# Patient Record
Sex: MALE | Race: WHITE | NOT HISPANIC OR LATINO | Employment: FULL TIME | URBAN - NONMETROPOLITAN AREA
[De-identification: names, ages, dates, MRNs, and addresses within clinical notes are randomized per-mention and may not be internally consistent; named-entity substitution may affect disease eponyms.]

---

## 2017-03-01 ENCOUNTER — OFFICE VISIT (OUTPATIENT)
Dept: FAMILY MEDICINE CLINIC | Facility: CLINIC | Age: 67
End: 2017-03-01

## 2017-03-01 VITALS
DIASTOLIC BLOOD PRESSURE: 74 MMHG | OXYGEN SATURATION: 99 % | SYSTOLIC BLOOD PRESSURE: 128 MMHG | HEART RATE: 58 BPM | WEIGHT: 182 LBS | BODY MASS INDEX: 26.88 KG/M2

## 2017-03-01 DIAGNOSIS — E11.9 DIABETES MELLITUS WITHOUT COMPLICATION (HCC): ICD-10-CM

## 2017-03-01 DIAGNOSIS — I25.10 ATHEROSCLEROSIS OF NATIVE CORONARY ARTERY OF NATIVE HEART WITHOUT ANGINA PECTORIS: Primary | ICD-10-CM

## 2017-03-01 DIAGNOSIS — I10 ESSENTIAL HYPERTENSION: ICD-10-CM

## 2017-03-01 PROCEDURE — 99214 OFFICE O/P EST MOD 30 MIN: CPT | Performed by: FAMILY MEDICINE

## 2017-03-01 NOTE — PROGRESS NOTES
Subjective   Kiran Moss is a 66 y.o. male.     Diabetes   He presents for his follow-up diabetic visit. He has type 2 diabetes mellitus. His disease course has been stable. Pertinent negatives for diabetes include no chest pain, no foot paresthesias, no foot ulcerations, no polydipsia and no polyuria. He monitors urine at home 1-2 x per day. His breakfast blood glucose is taken between 7-8 am. His breakfast blood glucose range is generally  mg/dl. His dinner blood glucose is taken after 8 pm. His dinner blood glucose range is generally 110-130 mg/dl. His highest blood glucose is 110-130 mg/dl. An ACE inhibitor/angiotensin II receptor blocker is being taken. Eye exam is current.   Hypertension   This is a chronic problem. The problem is unchanged. The problem is controlled (bp 130-132/70-75). Pertinent negatives include no chest pain, orthopnea, palpitations, peripheral edema, PND or shortness of breath.   Coronary Artery Disease   Presents for follow-up visit. Pertinent negatives include no chest pain, chest pressure, chest tightness, palpitations or shortness of breath.   Arthritis   Presents for follow-up visit. He complains of stiffness. He reports no pain, joint swelling or joint warmth. The symptoms have been worsening. Affected locations include the left wrist, right knee and left knee. His pain is at a severity of 7/10.        The following portions of the patient's history were reviewed and updated as appropriate: allergies, current medications, past family history, past medical history, past social history, past surgical history and problem list.    Review of Systems   Respiratory: Negative for chest tightness and shortness of breath.    Cardiovascular: Negative for chest pain, palpitations, orthopnea and PND.   Endocrine: Negative for polydipsia and polyuria.   Musculoskeletal: Positive for arthritis and stiffness. Negative for joint swelling.       Objective   Physical Exam   Constitutional:  He is oriented to person, place, and time. He appears well-developed and well-nourished. No distress.   HENT:   Head: Normocephalic and atraumatic.   Cardiovascular: Normal rate, regular rhythm and intact distal pulses.  Exam reveals no gallop and no friction rub.    Murmur heard.   Systolic murmur is present with a grade of 2/6   Pulmonary/Chest: Effort normal and breath sounds normal. No respiratory distress. He has no wheezes. He has no rales. He exhibits no tenderness.   Musculoskeletal: He exhibits no edema.        Right knee: He exhibits normal range of motion. Tenderness found.        Left knee: He exhibits normal range of motion. Tenderness found.        Left hand: He exhibits tenderness. He exhibits normal range of motion.    Kiran had a diabetic foot exam performed (calus noted) today.   During the foot exam he had a monofilament test performed (normal).    Vascular Status -  His exam exhibits right foot vasculature normal. His exam exhibits no right foot edema. His exam exhibits left foot vasculature normal. His exam exhibits no left foot edema.  Neurological: He is alert and oriented to person, place, and time.   Skin: Skin is warm and dry. He is not diaphoretic.   Psychiatric: He has a normal mood and affect. His behavior is normal. Judgment and thought content normal.   Nursing note and vitals reviewed.      Assessment/Plan   Problems Addressed this Visit        Cardiovascular and Mediastinum    Essential hypertension    Coronary atherosclerosis of native coronary artery - Primary       Endocrine    Diabetes mellitus without complication

## 2017-05-08 ENCOUNTER — OFFICE VISIT (OUTPATIENT)
Dept: FAMILY MEDICINE CLINIC | Facility: CLINIC | Age: 67
End: 2017-05-08

## 2017-05-08 ENCOUNTER — APPOINTMENT (OUTPATIENT)
Dept: LAB | Facility: HOSPITAL | Age: 67
End: 2017-05-08

## 2017-05-08 VITALS
BODY MASS INDEX: 28.19 KG/M2 | HEART RATE: 77 BPM | HEIGHT: 69 IN | WEIGHT: 190.31 LBS | SYSTOLIC BLOOD PRESSURE: 138 MMHG | DIASTOLIC BLOOD PRESSURE: 76 MMHG | OXYGEN SATURATION: 98 %

## 2017-05-08 DIAGNOSIS — I25.10 ATHEROSCLEROSIS OF NATIVE CORONARY ARTERY OF NATIVE HEART WITHOUT ANGINA PECTORIS: ICD-10-CM

## 2017-05-08 DIAGNOSIS — E78.00 PURE HYPERCHOLESTEROLEMIA: ICD-10-CM

## 2017-05-08 DIAGNOSIS — I35.0 NONRHEUMATIC AORTIC VALVE STENOSIS: ICD-10-CM

## 2017-05-08 DIAGNOSIS — Q23.1 BICUSPID AORTIC VALVE: ICD-10-CM

## 2017-05-08 DIAGNOSIS — Z12.11 SCREEN FOR COLON CANCER: ICD-10-CM

## 2017-05-08 DIAGNOSIS — I10 ESSENTIAL HYPERTENSION: ICD-10-CM

## 2017-05-08 DIAGNOSIS — I48.0 PAROXYSMAL ATRIAL FIBRILLATION (HCC): ICD-10-CM

## 2017-05-08 DIAGNOSIS — E11.29 TYPE 2 DIABETES MELLITUS WITH MICROALBUMINURIA, WITHOUT LONG-TERM CURRENT USE OF INSULIN (HCC): ICD-10-CM

## 2017-05-08 DIAGNOSIS — I25.10 CORONARY ARTERIOSCLEROSIS: Primary | ICD-10-CM

## 2017-05-08 DIAGNOSIS — R80.9 TYPE 2 DIABETES MELLITUS WITH MICROALBUMINURIA, WITHOUT LONG-TERM CURRENT USE OF INSULIN (HCC): ICD-10-CM

## 2017-05-08 LAB
ALBUMIN SERPL-MCNC: 4.4 G/DL (ref 3.4–4.8)
ALBUMIN UR-MCNC: 31.2 MG/L
ALBUMIN/GLOB SERPL: 1.2 G/DL (ref 1.1–1.8)
ALP SERPL-CCNC: 73 U/L (ref 38–126)
ALT SERPL W P-5'-P-CCNC: 23 U/L (ref 21–72)
ANION GAP SERPL CALCULATED.3IONS-SCNC: 15 MMOL/L (ref 5–15)
ARTICHOKE IGE QN: 122 MG/DL (ref 1–129)
AST SERPL-CCNC: 41 U/L (ref 17–59)
BILIRUB SERPL-MCNC: 0.6 MG/DL (ref 0.2–1.3)
BUN BLD-MCNC: 21 MG/DL (ref 7–21)
BUN/CREAT SERPL: 21.4 (ref 7–25)
CALCIUM SPEC-SCNC: 10 MG/DL (ref 8.4–10.2)
CHLORIDE SERPL-SCNC: 100 MMOL/L (ref 95–110)
CHOLEST SERPL-MCNC: 211 MG/DL (ref 0–199)
CO2 SERPL-SCNC: 27 MMOL/L (ref 22–31)
CREAT BLD-MCNC: 0.98 MG/DL (ref 0.7–1.3)
CREAT UR-MCNC: 164.4 MG/DL
GFR SERPL CREATININE-BSD FRML MDRD: 77 ML/MIN/1.73 (ref 49–113)
GLOBULIN UR ELPH-MCNC: 3.7 GM/DL (ref 2.3–3.5)
GLUCOSE BLD-MCNC: 160 MG/DL (ref 60–100)
HBA1C MFR BLD: 7.12 % (ref 4–5.6)
HDLC SERPL-MCNC: 29 MG/DL (ref 60–200)
LDLC/HDLC SERPL: 4.33 {RATIO} (ref 0–3.55)
MICROALBUMIN/CREAT UR: 189.8 MG/G (ref 0–30)
POTASSIUM BLD-SCNC: 5.1 MMOL/L (ref 3.5–5.1)
PROT SERPL-MCNC: 8.1 G/DL (ref 6.3–8.6)
SODIUM BLD-SCNC: 142 MMOL/L (ref 137–145)
TRIGL SERPL-MCNC: 282 MG/DL (ref 20–199)

## 2017-05-08 PROCEDURE — 82043 UR ALBUMIN QUANTITATIVE: CPT | Performed by: FAMILY MEDICINE

## 2017-05-08 PROCEDURE — 83036 HEMOGLOBIN GLYCOSYLATED A1C: CPT | Performed by: FAMILY MEDICINE

## 2017-05-08 PROCEDURE — 80053 COMPREHEN METABOLIC PANEL: CPT | Performed by: FAMILY MEDICINE

## 2017-05-08 PROCEDURE — 82570 ASSAY OF URINE CREATININE: CPT | Performed by: FAMILY MEDICINE

## 2017-05-08 PROCEDURE — 36415 COLL VENOUS BLD VENIPUNCTURE: CPT | Performed by: FAMILY MEDICINE

## 2017-05-08 PROCEDURE — 80061 LIPID PANEL: CPT | Performed by: FAMILY MEDICINE

## 2017-05-08 PROCEDURE — 99214 OFFICE O/P EST MOD 30 MIN: CPT | Performed by: FAMILY MEDICINE

## 2017-05-11 ENCOUNTER — TELEPHONE (OUTPATIENT)
Dept: FAMILY MEDICINE CLINIC | Facility: CLINIC | Age: 67
End: 2017-05-11

## 2017-05-11 RX ORDER — INSULIN GLARGINE 100 [IU]/ML
14 INJECTION, SOLUTION SUBCUTANEOUS DAILY
Qty: 10 ML | Refills: 5 | Status: SHIPPED | OUTPATIENT
Start: 2017-05-11 | End: 2018-05-10 | Stop reason: SDUPTHER

## 2017-05-11 RX ORDER — ATORVASTATIN CALCIUM 40 MG/1
40 TABLET, FILM COATED ORAL DAILY
Qty: 30 TABLET | Refills: 5 | Status: SHIPPED | OUTPATIENT
Start: 2017-05-11 | End: 2017-06-02 | Stop reason: ALTCHOICE

## 2017-05-15 RX ORDER — LOSARTAN POTASSIUM 50 MG/1
TABLET ORAL
Qty: 90 TABLET | Refills: 3 | Status: SHIPPED | OUTPATIENT
Start: 2017-05-15 | End: 2017-11-08 | Stop reason: SINTOL

## 2017-06-02 ENCOUNTER — OFFICE VISIT (OUTPATIENT)
Dept: CARDIOLOGY | Facility: CLINIC | Age: 67
End: 2017-06-02

## 2017-06-02 VITALS
WEIGHT: 191 LBS | DIASTOLIC BLOOD PRESSURE: 60 MMHG | HEART RATE: 56 BPM | SYSTOLIC BLOOD PRESSURE: 150 MMHG | BODY MASS INDEX: 28.29 KG/M2 | HEIGHT: 69 IN | OXYGEN SATURATION: 98 %

## 2017-06-02 DIAGNOSIS — E78.5 HYPERLIPIDEMIA, UNSPECIFIED HYPERLIPIDEMIA TYPE: Primary | ICD-10-CM

## 2017-06-02 DIAGNOSIS — I25.10 ATHEROSCLEROSIS OF NATIVE CORONARY ARTERY OF NATIVE HEART WITHOUT ANGINA PECTORIS: Chronic | ICD-10-CM

## 2017-06-02 DIAGNOSIS — I10 ESSENTIAL HYPERTENSION: Chronic | ICD-10-CM

## 2017-06-02 DIAGNOSIS — I35.0 NONRHEUMATIC AORTIC VALVE STENOSIS: Chronic | ICD-10-CM

## 2017-06-02 PROCEDURE — 99214 OFFICE O/P EST MOD 30 MIN: CPT | Performed by: INTERNAL MEDICINE

## 2017-06-02 RX ORDER — ROSUVASTATIN CALCIUM 10 MG/1
10 TABLET, COATED ORAL DAILY
Qty: 30 TABLET | Refills: 6 | Status: SHIPPED | OUTPATIENT
Start: 2017-06-02 | End: 2017-09-29 | Stop reason: SINTOL

## 2017-06-02 NOTE — PROGRESS NOTES
Chief complaint : Aortic valve disorder    History of Present Illness.  Pleasant 66-year-old gentleman who comes today for follow-up visit.  Patient has no chest pain or chest discomfort.  He has no shortness of breath orthopnea or PND.         Review of Systems   Constitution: Negative. Negative for decreased appetite, diaphoresis, weakness, night sweats, weight gain and weight loss.   HENT: Negative for headaches, hearing loss, nosebleeds and sore throat.    Eyes: Negative.  Negative for blurred vision and photophobia.   Cardiovascular: Negative for chest pain, claudication, dyspnea on exertion, irregular heartbeat, leg swelling, palpitations, paroxysmal nocturnal dyspnea and syncope.   Respiratory: Negative for cough, hemoptysis, shortness of breath and wheezing.    Endocrine: Negative for cold intolerance, heat intolerance, polydipsia, polyphagia and polyuria.   Hematologic/Lymphatic: Negative.    Skin: Negative for color change, dry skin, flushing, itching and rash.   Musculoskeletal: Negative.  Negative for muscle cramps, muscle weakness and myalgias.   Gastrointestinal: Negative for abdominal pain, change in bowel habit, diarrhea, hematemesis, melena, nausea and vomiting.   Genitourinary: Negative for dysuria, frequency and hematuria.   Neurological: Negative for dizziness, focal weakness, light-headedness, loss of balance, numbness and seizures.   Psychiatric/Behavioral: Negative.  Negative for substance abuse, suicidal ideas and thoughts of violence.   Allergic/Immunologic: Negative.        Past Medical History:   Diagnosis Date   • Actinic keratosis    • Acute bronchitis    • Acute upper respiratory infection     Acute upper respiratory infection, unspecified   • Aortic valve stenosis     Aortic valve stenosis - post AVR   • Atrial fibrillation     Atrial fibrillation - RSR post MAZE   • Bicuspid aortic valve     Bicuspid aortic valve - AVR replacement 12/16/14   • Coronary arteriosclerosis    • Coronary  atherosclerosis of native coronary artery     Coronary atherosclerosis of native coronary artery - S/P 3.0 x 23mm Xience V stent to mid RCA 12/11 (NSTEMI   • Cortical senile cataract    • Crushing injury     Crushing injury - left 4th finger   • Diabetes mellitus     Diabetes mellitus - no retinopathy on today's exam   • 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   • History of echocardiogram 12/14/2011    Echocardiogram W/O color flow 66592 (1) - Normal LV systolic function with diastolic dysfunction. Biscupid aortic valve w/mild to moderate aortic stenosis and mild aortic insufficiency. Mild left atrial enlargement.   • Hyperlipidemia     Hyperlipidemia - cannot tolerate higher dose than 20 mg lipitor   • Hypermetropia    • Inguinal hernia, recurrent     Inguinal hernia - recurrent   • Microalbuminuria    • Need for immunization against influenza     Needs influenza immunization   • Postoperative pain     Postoperative pain - 12/16/14 AVR Medtronic concave Aortic 25 mm Mosaic [id number] model [id number] serial [id number] MAZE Resolved   • Postoperative visit     Postoperative visit - 12/16/14 AVR Medtronic concave Aortic 25 mm Mosaic [id number] model [id number] serial [id number] MAZE   • Scalding pain on urination    • Type 2 diabetes mellitus        Family History   Problem Relation Age of Onset   • Diabetes Other    • Nephrolithiasis Other    • Coronary artery disease Neg Hx        Lisinopril     reports that he has quit smoking. He has never used smokeless tobacco. He reports that he drinks  alcohol. He reports that he does not use illicit drugs.    Objective     Vital Signs  Heart Rate:  [56] 56  BP: (150)/(60) 150/60    Physical Exam   Constitutional: He is oriented to person, place, and time. He appears well-developed and well-nourished.   HENT:   Head: Normocephalic and atraumatic.   Eyes: Conjunctivae and EOM are normal. Pupils are equal, round, and reactive to light.   Neck: Neck supple. No JVD present. Carotid bruit is not present. No tracheal deviation and no edema present.   Cardiovascular: Normal rate, regular rhythm, S1 normal, S2 normal, normal heart sounds and intact distal pulses.  Exam reveals no gallop, no S3, no S4 and no friction rub.    No murmur heard.  Pulmonary/Chest: Effort normal and breath sounds normal. He has no wheezes. He has no rales. He exhibits no tenderness.   Abdominal: Bowel sounds are normal. He exhibits no abdominal bruit and no pulsatile midline mass. There is no rebound and no guarding.   Musculoskeletal: Normal range of motion. He exhibits no edema.   Neurological: He is alert and oriented to person, place, and time.   Skin: Skin is warm and dry.   Psychiatric: He has a normal mood and affect.       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 Hyperlipidemia, unspecified hyperlipidemia  type  We will continue with current medications.  - Lipid Panel; Future  - Comprehensive Metabolic Panel; Future    2. Essential hypertension  Blood pressure is fairly controlled we will continue with current medications and risk factor modification.    3. Atherosclerosis of native coronary artery of native heart without angina pectoris  December 2011: 3.0 x 23 mm Xience V stent to the RCA  December 2014: Cardiac catheterization revealed patent stent in the RCA with minimal nonobstructive coronary disease noted in the LAD of approximately 30%   Plan:  · Continue with guideline direct medical therapy.  · Continue with risk factor modification.    4. Nonrheumatic aortic valve stenosis  December 16, 2014 : Aortic valve replacement (Medtronic mosaic porcine heart valve  model #305, size 25 mm, serial #[id number] along with extensive maze procedure and cryoablation.  Left atrial appendage excision.  Last echocardiogram was March 11, 2015 which revealed well seated bioprosthetic aortic valve the aortic position with normal function.  Mean gradient across the valve is 21 mmHg.  Plan:  · We will continue to monitor patient's aortic valve 22.  · 2-D echocardiogram will be performed with any clinical change or by 2020.  · Continue with aspirin    I discussed the patients findings and my recommendations with patient.          This document has been electronically signed by Dana Squires MD on June 25, 2017 10:05 AM     Daan Squires MD  06/02/17  10:49 AM

## 2017-06-08 ENCOUNTER — TELEPHONE (OUTPATIENT)
Dept: CARDIOLOGY | Facility: CLINIC | Age: 67
End: 2017-06-08

## 2017-06-08 NOTE — TELEPHONE ENCOUNTER
Mr. Moss called concerning his Low BP and heart rate. Upon questioning, he is nonsymptomatic at this time. He took his metoprolol this morning. Dr. Squires was notified and wants him to hold his Metoprolol for now. Mr. Moss will be seen tomorrow for evaluation.

## 2017-06-09 ENCOUNTER — OFFICE VISIT (OUTPATIENT)
Dept: CARDIOLOGY | Facility: CLINIC | Age: 67
End: 2017-06-09

## 2017-06-09 VITALS
BODY MASS INDEX: 28.44 KG/M2 | OXYGEN SATURATION: 98 % | DIASTOLIC BLOOD PRESSURE: 60 MMHG | WEIGHT: 192 LBS | HEART RATE: 56 BPM | HEIGHT: 69 IN | SYSTOLIC BLOOD PRESSURE: 132 MMHG

## 2017-06-09 DIAGNOSIS — E78.00 PURE HYPERCHOLESTEROLEMIA: Primary | Chronic | ICD-10-CM

## 2017-06-09 DIAGNOSIS — I10 ESSENTIAL HYPERTENSION: Chronic | ICD-10-CM

## 2017-06-09 DIAGNOSIS — R00.1 BRADYCARDIA: Primary | ICD-10-CM

## 2017-06-09 DIAGNOSIS — I48.0 PAROXYSMAL ATRIAL FIBRILLATION (HCC): ICD-10-CM

## 2017-06-09 PROCEDURE — 99213 OFFICE O/P EST LOW 20 MIN: CPT | Performed by: INTERNAL MEDICINE

## 2017-06-09 RX ORDER — TAMSULOSIN HYDROCHLORIDE 0.4 MG/1
CAPSULE ORAL
Refills: 0 | COMMUNITY
Start: 2017-06-07 | End: 2017-11-08

## 2017-06-09 RX ORDER — CIPROFLOXACIN 500 MG/1
TABLET, FILM COATED ORAL
Refills: 0 | COMMUNITY
Start: 2017-06-07 | End: 2017-11-08

## 2017-06-25 NOTE — PROGRESS NOTES
Chief complaint : Hypotension    History of Present Illness patient apparently had an episode of low blood pressure and low heart rate.  This happened during his recent literature see procedure for nephrolithiasis.  His losartan and metoprolol was on hold.  Currently his symptoms have improved.  He has no chest pain or chest discomfort.         Review of Systems   Constitution: Negative. Negative for decreased appetite, diaphoresis, weakness, night sweats, weight gain and weight loss.   HENT: Negative for headaches, hearing loss, nosebleeds and sore throat.    Eyes: Negative.  Negative for blurred vision and photophobia.   Cardiovascular: Negative for chest pain, claudication, dyspnea on exertion, irregular heartbeat, leg swelling, palpitations, paroxysmal nocturnal dyspnea and syncope.   Respiratory: Negative for cough, hemoptysis, shortness of breath and wheezing.    Endocrine: Negative for cold intolerance, heat intolerance, polydipsia, polyphagia and polyuria.   Hematologic/Lymphatic: Negative.    Skin: Negative for color change, dry skin, flushing, itching and rash.   Musculoskeletal: Negative.  Negative for muscle cramps, muscle weakness and myalgias.   Gastrointestinal: Negative for abdominal pain, change in bowel habit, diarrhea, hematemesis, melena, nausea and vomiting.   Genitourinary: Negative for dysuria, frequency and hematuria.   Neurological: Negative for dizziness, focal weakness, light-headedness, loss of balance, numbness and seizures.   Psychiatric/Behavioral: Negative.  Negative for substance abuse, suicidal ideas and thoughts of violence.   Allergic/Immunologic: Negative.        Past Medical History:   Diagnosis Date   • Actinic keratosis    • Acute bronchitis    • Acute upper respiratory infection     Acute upper respiratory infection, unspecified   • Aortic valve stenosis     Aortic valve stenosis - post AVR   • Atrial fibrillation     Atrial fibrillation - RSR post MAZE   • Bicuspid aortic  valve     Bicuspid aortic valve - AVR replacement 12/16/14   • Coronary arteriosclerosis    • Coronary atherosclerosis of native coronary artery     Coronary atherosclerosis of native coronary artery - S/P 3.0 x 23mm Xience V stent to mid RCA 12/11 (NSTEMI   • Cortical senile cataract    • Crushing injury     Crushing injury - left 4th finger   • Diabetes mellitus     Diabetes mellitus - no retinopathy on today's exam   • 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   • History of echocardiogram 12/14/2011    Echocardiogram W/O color flow 84642 (1) - Normal LV systolic function with diastolic dysfunction. Biscupid aortic valve w/mild to moderate aortic stenosis and mild aortic insufficiency. Mild left atrial enlargement.   • Hyperlipidemia     Hyperlipidemia - cannot tolerate higher dose than 20 mg lipitor   • Hypermetropia    • Inguinal hernia, recurrent     Inguinal hernia - recurrent   • Microalbuminuria    • Need for immunization against influenza     Needs influenza immunization   • Postoperative pain     Postoperative pain - 12/16/14 AVR Medtronic concave Aortic 25 mm Mosaic [id number] model [id number] serial [id number] MAZE Resolved   • Postoperative visit     Postoperative visit - 12/16/14 AVR Medtronic concave Aortic 25 mm Mosaic [id number] model [id number] serial [id number] MAZE   • Scalding pain on urination    • Type 2 diabetes mellitus        Family History   Problem Relation Age of Onset   • Diabetes Other    • Nephrolithiasis Other    • Coronary artery disease Neg Hx   "      Lisinopril     reports that he has quit smoking. He has never used smokeless tobacco. He reports that he drinks alcohol. He reports that he does not use illicit drugs.    Objective     Vital Signs:     6/9/17 6/2/17 5/8/17    /60 150/60 138/76   Heart Rate 56 56 77   SpO2 98 % 98 % 98 %   Weight 192 lb (87.1 kg) 191 lb (86.6 kg) 190 lb 5 oz (86.3 kg)   Height 69\" (175.3 cm) 69\" (175.3 cm) 69\" (175.3 cm)   BMI (Calculated) 28.3 28.2 28.1   BSA (Calculated - sq m) 2.03 sq meters 2.03 sq meters 2.02 sq meters       Physical Exam   Constitutional: He is oriented to person, place, and time. He appears well-developed and well-nourished.   HENT:   Head: Normocephalic and atraumatic.   Eyes: Conjunctivae and EOM are normal. Pupils are equal, round, and reactive to light.   Neck: Neck supple. No JVD present. Carotid bruit is not present. No tracheal deviation and no edema present.   Cardiovascular: Normal rate, regular rhythm, S1 normal, S2 normal, normal heart sounds and intact distal pulses.  Exam reveals no gallop, no S3, no S4 and no friction rub.    No murmur heard.  Pulmonary/Chest: Effort normal and breath sounds normal. He has no wheezes. He has no rales. He exhibits no tenderness.   Abdominal: Bowel sounds are normal. He exhibits no abdominal bruit and no pulsatile midline mass. There is no rebound and no guarding.   Musculoskeletal: Normal range of motion. He exhibits no edema.   Neurological: He is alert and oriented to person, place, and time.   Skin: Skin is warm and dry.   Psychiatric: He has a normal mood and affect.         ECG 12 Lead  Date/Time: 6/26/2017 11:29 AM  Performed by: SIDRA RANKIN  Authorized by: SIDRA RANKIN   Previous ECG: no previous ECG available  Rhythm: sinus bradycardia  Rate: bradycardic  BPM: 54  Conduction: right bundle branch block  ST Segments: ST segments normal  T Waves: T waves normal  QRS axis: normal  Other: no other findings  Clinical impression: abnormal " ECG            Assessment/Plan     Patient Active Problem List   Diagnosis   • Hyperlipidemia   • Essential hypertension   • Benign non-nodular prostatic hyperplasia with lower urinary tract symptoms   • Type 2 diabetes mellitus with microalbuminuria   • Diabetic retinopathy   • Coronary atherosclerosis of native coronary artery   • Bicuspid aortic valve   • Aortic valve stenosis   • Cortical age-related cataract   • Nuclear cataract   • Microalbuminuria   • Inguinal hernia, recurrent   • 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 Pure hypercholesterolemia  We will continue with current medications    2. Essential hypertension  Since his hypotension and bradycardia has resolved I have recommended that we restart his medication.    I discussed the patients findings and my recommendations with patient.        This document has been electronically signed by Dana Squires MD on June 26, 2017 11:27 AM     Dana Squires MD  06/26/17  11:26 AM

## 2017-06-26 PROCEDURE — 93000 ELECTROCARDIOGRAM COMPLETE: CPT | Performed by: INTERNAL MEDICINE

## 2017-09-29 ENCOUNTER — OFFICE VISIT (OUTPATIENT)
Dept: CARDIOLOGY | Facility: CLINIC | Age: 67
End: 2017-09-29

## 2017-09-29 VITALS
WEIGHT: 189 LBS | BODY MASS INDEX: 27.99 KG/M2 | OXYGEN SATURATION: 97 % | SYSTOLIC BLOOD PRESSURE: 170 MMHG | DIASTOLIC BLOOD PRESSURE: 80 MMHG | HEIGHT: 69 IN | HEART RATE: 70 BPM

## 2017-09-29 DIAGNOSIS — I35.0 NONRHEUMATIC AORTIC VALVE STENOSIS: Chronic | ICD-10-CM

## 2017-09-29 DIAGNOSIS — I10 ESSENTIAL HYPERTENSION: Chronic | ICD-10-CM

## 2017-09-29 DIAGNOSIS — I25.10 ATHEROSCLEROSIS OF NATIVE CORONARY ARTERY OF NATIVE HEART WITHOUT ANGINA PECTORIS: Chronic | ICD-10-CM

## 2017-09-29 DIAGNOSIS — E78.00 PURE HYPERCHOLESTEROLEMIA: Primary | Chronic | ICD-10-CM

## 2017-09-29 PROCEDURE — 99213 OFFICE O/P EST LOW 20 MIN: CPT | Performed by: INTERNAL MEDICINE

## 2017-09-29 RX ORDER — GLIPIZIDE 10 MG/1
TABLET ORAL
Qty: 180 TABLET | Refills: 3 | Status: SHIPPED | OUTPATIENT
Start: 2017-09-29 | End: 2018-05-10 | Stop reason: SDUPTHER

## 2017-09-29 RX ORDER — CELECOXIB 50 MG/1
50 CAPSULE ORAL 2 TIMES DAILY
Qty: 60 CAPSULE | Refills: 6 | Status: SHIPPED | OUTPATIENT
Start: 2017-09-29 | End: 2018-05-10 | Stop reason: SDUPTHER

## 2017-09-29 RX ORDER — HYDROCHLOROTHIAZIDE 25 MG/1
25 TABLET ORAL DAILY
Qty: 30 TABLET | Refills: 11 | Status: SHIPPED | OUTPATIENT
Start: 2017-09-29 | End: 2018-05-16 | Stop reason: SINTOL

## 2017-11-08 ENCOUNTER — OFFICE VISIT (OUTPATIENT)
Dept: FAMILY MEDICINE CLINIC | Facility: CLINIC | Age: 67
End: 2017-11-08

## 2017-11-08 VITALS
HEIGHT: 69 IN | BODY MASS INDEX: 26.66 KG/M2 | HEART RATE: 74 BPM | DIASTOLIC BLOOD PRESSURE: 70 MMHG | SYSTOLIC BLOOD PRESSURE: 122 MMHG | OXYGEN SATURATION: 99 % | WEIGHT: 180 LBS

## 2017-11-08 DIAGNOSIS — I10 ESSENTIAL HYPERTENSION: ICD-10-CM

## 2017-11-08 DIAGNOSIS — Z23 FLU VACCINE NEED: ICD-10-CM

## 2017-11-08 DIAGNOSIS — E11.29 TYPE 2 DIABETES MELLITUS WITH MICROALBUMINURIA, WITH LONG-TERM CURRENT USE OF INSULIN (HCC): ICD-10-CM

## 2017-11-08 DIAGNOSIS — E78.00 PURE HYPERCHOLESTEROLEMIA: ICD-10-CM

## 2017-11-08 DIAGNOSIS — Z79.4 TYPE 2 DIABETES MELLITUS WITH MICROALBUMINURIA, WITH LONG-TERM CURRENT USE OF INSULIN (HCC): ICD-10-CM

## 2017-11-08 DIAGNOSIS — R80.9 MICROALBUMINURIA: ICD-10-CM

## 2017-11-08 DIAGNOSIS — R80.9 TYPE 2 DIABETES MELLITUS WITH MICROALBUMINURIA, WITH LONG-TERM CURRENT USE OF INSULIN (HCC): ICD-10-CM

## 2017-11-08 DIAGNOSIS — I25.10 ATHEROSCLEROSIS OF NATIVE CORONARY ARTERY OF NATIVE HEART WITHOUT ANGINA PECTORIS: ICD-10-CM

## 2017-11-08 DIAGNOSIS — Z23 PNEUMOCOCCAL VACCINATION ADMINISTERED AT CURRENT VISIT: ICD-10-CM

## 2017-11-08 DIAGNOSIS — Z12.11 SCREEN FOR COLON CANCER: Primary | ICD-10-CM

## 2017-11-08 PROCEDURE — 90471 IMMUNIZATION ADMIN: CPT | Performed by: FAMILY MEDICINE

## 2017-11-08 PROCEDURE — 90472 IMMUNIZATION ADMIN EACH ADD: CPT | Performed by: FAMILY MEDICINE

## 2017-11-08 PROCEDURE — 90732 PPSV23 VACC 2 YRS+ SUBQ/IM: CPT | Performed by: FAMILY MEDICINE

## 2017-11-08 PROCEDURE — 99214 OFFICE O/P EST MOD 30 MIN: CPT | Performed by: FAMILY MEDICINE

## 2017-11-08 PROCEDURE — 90662 IIV NO PRSV INCREASED AG IM: CPT | Performed by: FAMILY MEDICINE

## 2017-11-08 RX ORDER — FENOFIBRATE 160 MG/1
160 TABLET ORAL DAILY
Qty: 30 TABLET | Refills: 11 | Status: SHIPPED | OUTPATIENT
Start: 2017-11-08 | End: 2018-05-10 | Stop reason: SDUPTHER

## 2017-11-08 NOTE — PROGRESS NOTES
Subjective   Kiran Moss is a 67 y.o. male.     Hypertension   This is a chronic problem. The current episode started more than 1 year ago. The problem is unchanged. The problem is controlled (bp 130-132/70-75). Associated symptoms include peripheral edema. Pertinent negatives include no orthopnea or PND.   Diabetes   He presents for his follow-up diabetic visit. He has type 2 diabetes mellitus. His disease course has been stable. Associated symptoms include polyuria. Pertinent negatives for diabetes include no foot paresthesias, no foot ulcerations and no polydipsia. He monitors urine at home 1-2 x per day. His breakfast blood glucose is taken between 7-8 am. His breakfast blood glucose range is generally 110-130 mg/dl. His dinner blood glucose is taken after 8 pm. His dinner blood glucose range is generally 140-180 mg/dl. An ACE inhibitor/angiotensin II receptor blocker is being taken. Eye exam is current.   Hyperlipidemia   Associated symptoms include myalgias.   Coronary Artery Disease   Presents for follow-up visit. Pertinent negatives include no chest tightness or leg swelling. Risk factors include hyperlipidemia.        The following portions of the patient's history were reviewed and updated as appropriate: allergies, current medications, past family history, past medical history, past social history, past surgical history and problem list.    Review of Systems   Respiratory: Negative for chest tightness.    Cardiovascular: Negative for orthopnea, leg swelling and PND.   Endocrine: Positive for polyuria. Negative for polydipsia.   Musculoskeletal: Positive for myalgias.       Objective   Physical Exam   Constitutional: He is oriented to person, place, and time. He appears well-developed and well-nourished. No distress.   HENT:   Head: Normocephalic and atraumatic.   Cardiovascular: Normal rate, regular rhythm and intact distal pulses.  Exam reveals no gallop and no friction rub.    Murmur heard.    Systolic murmur is present with a grade of 2/6   Pulmonary/Chest: Effort normal and breath sounds normal. No respiratory distress. He has no wheezes. He has no rales. He exhibits no tenderness.   Musculoskeletal: He exhibits no edema.    Kiran had a diabetic foot exam performed (calus noted) today.   During the foot exam he had a monofilament test performed (normal).    Vascular Status -  His exam exhibits right foot vasculature normal. His exam exhibits no right foot edema. His exam exhibits left foot vasculature normal. His exam exhibits no left foot edema.  Neurological: He is alert and oriented to person, place, and time.   Skin: Skin is warm and dry. He is not diaphoretic.   Psychiatric: He has a normal mood and affect. His behavior is normal. Judgment and thought content normal.   Nursing note and vitals reviewed.      Assessment/Plan   Problems Addressed this Visit        Cardiovascular and Mediastinum    Essential hypertension (Chronic)    Relevant Orders    Comprehensive Metabolic Panel       Endocrine    Diabetes mellitus    Relevant Orders    Hemoglobin A1c    Microalbumin / Creatinine Urine Ratio - Urine, Clean Catch       Genitourinary    Microalbuminuria      Other Visit Diagnoses     Screen for colon cancer    -  Primary    Relevant Orders    Ambulatory Referral to General Surgery    Atherosclerosis of native coronary artery of native heart without angina pectoris        Pure hypercholesterolemia        Relevant Medications    fenofibrate 160 MG tablet    Other Relevant Orders    Lipid Panel

## 2017-11-09 ENCOUNTER — PREP FOR SURGERY (OUTPATIENT)
Dept: OTHER | Facility: HOSPITAL | Age: 67
End: 2017-11-09

## 2017-11-09 DIAGNOSIS — Z12.11 SCREEN FOR COLON CANCER: Primary | ICD-10-CM

## 2017-11-09 RX ORDER — DEXTROSE AND SODIUM CHLORIDE 5; .45 G/100ML; G/100ML
100 INJECTION, SOLUTION INTRAVENOUS CONTINUOUS
Status: CANCELLED | OUTPATIENT
Start: 2017-11-20

## 2017-11-13 PROBLEM — Z12.11 SCREEN FOR COLON CANCER: Status: ACTIVE | Noted: 2017-11-13

## 2017-11-20 ENCOUNTER — ANESTHESIA EVENT (OUTPATIENT)
Dept: GASTROENTEROLOGY | Facility: HOSPITAL | Age: 67
End: 2017-11-20

## 2017-11-20 ENCOUNTER — HOSPITAL ENCOUNTER (OUTPATIENT)
Facility: HOSPITAL | Age: 67
Setting detail: HOSPITAL OUTPATIENT SURGERY
Discharge: HOME | End: 2017-11-20
Attending: SURGERY | Admitting: SURGERY

## 2017-11-20 ENCOUNTER — ANESTHESIA (OUTPATIENT)
Dept: GASTROENTEROLOGY | Facility: HOSPITAL | Age: 67
End: 2017-11-20

## 2017-11-20 VITALS
TEMPERATURE: 97.6 F | HEART RATE: 81 BPM | RESPIRATION RATE: 16 BRPM | HEIGHT: 69 IN | OXYGEN SATURATION: 98 % | DIASTOLIC BLOOD PRESSURE: 72 MMHG | WEIGHT: 182 LBS | BODY MASS INDEX: 26.96 KG/M2 | SYSTOLIC BLOOD PRESSURE: 115 MMHG

## 2017-11-20 DIAGNOSIS — Z12.11 SCREEN FOR COLON CANCER: ICD-10-CM

## 2017-11-20 LAB — GLUCOSE BLDC GLUCOMTR-MCNC: 95 MG/DL (ref 70–130)

## 2017-11-20 PROCEDURE — 82962 GLUCOSE BLOOD TEST: CPT

## 2017-11-20 PROCEDURE — 45378 DIAGNOSTIC COLONOSCOPY: CPT | Performed by: SURGERY

## 2017-11-20 PROCEDURE — 25010000002 PROPOFOL 10 MG/ML EMULSION: Performed by: NURSE ANESTHETIST, CERTIFIED REGISTERED

## 2017-11-20 RX ORDER — ONDANSETRON 2 MG/ML
4 INJECTION INTRAMUSCULAR; INTRAVENOUS ONCE AS NEEDED
Status: DISCONTINUED | OUTPATIENT
Start: 2017-11-20 | End: 2017-11-20 | Stop reason: HOSPADM

## 2017-11-20 RX ORDER — PROMETHAZINE HYDROCHLORIDE 25 MG/ML
12.5 INJECTION, SOLUTION INTRAMUSCULAR; INTRAVENOUS ONCE AS NEEDED
Status: DISCONTINUED | OUTPATIENT
Start: 2017-11-20 | End: 2017-11-20 | Stop reason: HOSPADM

## 2017-11-20 RX ORDER — PROPOFOL 10 MG/ML
VIAL (ML) INTRAVENOUS AS NEEDED
Status: DISCONTINUED | OUTPATIENT
Start: 2017-11-20 | End: 2017-11-20 | Stop reason: SURG

## 2017-11-20 RX ORDER — DEXTROSE AND SODIUM CHLORIDE 5; .45 G/100ML; G/100ML
100 INJECTION, SOLUTION INTRAVENOUS CONTINUOUS
Status: DISCONTINUED | OUTPATIENT
Start: 2017-11-20 | End: 2017-11-20 | Stop reason: HOSPADM

## 2017-11-20 RX ORDER — PROMETHAZINE HYDROCHLORIDE 25 MG/1
25 TABLET ORAL ONCE AS NEEDED
Status: DISCONTINUED | OUTPATIENT
Start: 2017-11-20 | End: 2017-11-20 | Stop reason: HOSPADM

## 2017-11-20 RX ORDER — MEPERIDINE HYDROCHLORIDE 50 MG/ML
12.5 INJECTION INTRAMUSCULAR; INTRAVENOUS; SUBCUTANEOUS
Status: DISCONTINUED | OUTPATIENT
Start: 2017-11-20 | End: 2017-11-20 | Stop reason: HOSPADM

## 2017-11-20 RX ORDER — DEXAMETHASONE SODIUM PHOSPHATE 4 MG/ML
8 INJECTION, SOLUTION INTRA-ARTICULAR; INTRALESIONAL; INTRAMUSCULAR; INTRAVENOUS; SOFT TISSUE ONCE AS NEEDED
Status: DISCONTINUED | OUTPATIENT
Start: 2017-11-20 | End: 2017-11-20 | Stop reason: HOSPADM

## 2017-11-20 RX ORDER — PROMETHAZINE HYDROCHLORIDE 25 MG/1
25 SUPPOSITORY RECTAL ONCE AS NEEDED
Status: DISCONTINUED | OUTPATIENT
Start: 2017-11-20 | End: 2017-11-20 | Stop reason: HOSPADM

## 2017-11-20 RX ADMIN — PROPOFOL 30 MG: 10 INJECTION, EMULSION INTRAVENOUS at 10:05

## 2017-11-20 RX ADMIN — PROPOFOL 50 MG: 10 INJECTION, EMULSION INTRAVENOUS at 10:07

## 2017-11-20 RX ADMIN — DEXTROSE AND SODIUM CHLORIDE 100 ML/HR: 5; 450 INJECTION, SOLUTION INTRAVENOUS at 09:36

## 2017-11-20 RX ADMIN — PROPOFOL 50 MG: 10 INJECTION, EMULSION INTRAVENOUS at 10:14

## 2017-11-20 RX ADMIN — PROPOFOL 50 MG: 10 INJECTION, EMULSION INTRAVENOUS at 10:20

## 2017-11-20 RX ADMIN — PROPOFOL 20 MG: 10 INJECTION, EMULSION INTRAVENOUS at 10:11

## 2017-11-20 NOTE — PLAN OF CARE
Problem: Patient Care Overview (Adult)  Goal: Plan of Care Review  Outcome: Ongoing (interventions implemented as appropriate)    11/20/17 1036   Coping/Psychosocial Response Interventions   Plan Of Care Reviewed With patient   Patient Care Overview   Progress no change   Outcome Evaluation   Outcome Summary/Follow up Plan jerson well

## 2017-11-20 NOTE — PLAN OF CARE
Problem: Patient Care Overview (Adult)  Goal: Plan of Care Review    11/20/17 1030   Coping/Psychosocial Response Interventions   Plan Of Care Reviewed With patient   Patient Care Overview   Progress no change   Outcome Evaluation   Outcome Summary/Follow up Plan vss         Problem: GI Endoscopy (Adult)  Goal: Signs and Symptoms of Listed Potential Problems Will be Absent or Manageable (GI Endoscopy)    11/20/17 1030   GI Endoscopy   Problems Assessed (GI Endoscopy) all   Problems Present (GI Endoscopy) none

## 2017-11-20 NOTE — ANESTHESIA PREPROCEDURE EVALUATION
Anesthesia Evaluation     Patient summary reviewed and Nursing notes reviewed   no history of anesthetic complications:  NPO Solid Status: > 8 hours  NPO Liquid Status: > 8 hours     Airway   Mallampati: II  TM distance: >3 FB  Neck ROM: full  no difficulty expected  Dental - normal exam     Pulmonary - normal exam    breath sounds clear to auscultation  (+) asthma,   (-) shortness of breath  Cardiovascular - normal exam  Exercise tolerance: good (4-7 METS)    Rhythm: regular  Rate: normal    (+) hypertension, valvular problems/murmurs, CAD, dysrhythmias, hyperlipidemia  (-) angina, orthopnea, GOLDMAN      Neuro/Psych- negative ROS  (-) CVA  GI/Hepatic/Renal/Endo    (+)  diabetes mellitus,     Musculoskeletal (-) negative ROS    Abdominal  - normal exam   Substance History - negative use     OB/GYN negative ob/gyn ROS         Other - negative ROS                                       Anesthesia Plan    ASA 2     MAC     intravenous induction   Anesthetic plan and risks discussed with patient.

## 2017-11-20 NOTE — ANESTHESIA POSTPROCEDURE EVALUATION
Patient: Kiran Moss    Procedure Summary     Date Anesthesia Start Anesthesia Stop Room / Location    11/20/17 0959 1029 [facility] ENDOSCOPY 2 / [facility] ENDOSCOPY       Procedure Diagnosis Surgeon Provider    COLONOSCOPY (N/A ) Screen for colon cancer  (Screen for colon cancer [Z12.11]) MD Josiah Lou CRNA          Anesthesia Type: MAC  Last vitals  BP   139/83 (11/20/17 0927)   Temp   97.8 °F (36.6 °C) (11/20/17 0927)   Pulse   96 (11/20/17 0927)   Resp   18 (11/20/17 0927)     SpO2   97 % (11/20/17 0927)     Post Anesthesia Care and Evaluation    Patient location during evaluation: bedside  Patient participation: complete - patient participated  Level of consciousness: awake and alert  Pain management: adequate  Airway patency: patent  Anesthetic complications: No anesthetic complications    Cardiovascular status: acceptable  Respiratory status: acceptable  Hydration status: acceptable

## 2017-11-20 NOTE — PLAN OF CARE
Problem: GI Endoscopy (Adult)  Goal: Signs and Symptoms of Listed Potential Problems Will be Absent or Manageable (GI Endoscopy)  Outcome: Ongoing (interventions implemented as appropriate)    11/20/17 1035   GI Endoscopy   Problems Assessed (GI Endoscopy) all   Problems Present (GI Endoscopy) none

## 2017-11-20 NOTE — H&P
No chief complaint on file.  Dr Powell referred for screening.  Kiran Moss is a 67 y.o. male referred today for evaluation for colonoscopy.  He notes no change in bowel habits, no blood in the stool.   Attempted cscope 10yrs ago but poor prep.  Not good study per Dr Hernandez.   Prior Colonoscopy:no  Prior Polyps:no  Family History of Colon Cancer:no  On anticoagulation:no    Past Surgical History:   Procedure Laterality Date   • ANGIOPLASTY  07/16/2000    Angioplasty, coronary (1) - wide open patency to anterior descending artery at the inflation sites. No intimal luminal irregularities, tears, or residual stenosis found   • AORTIC VALVE REPAIR/REPLACEMENT  12/16/2014    Replacement of aortic valve (1) - 12/16/14 AVR RxAntetronic concave Aortic 25 mm Mosaic [id number] model [id number] serial [id number] MAZE   • CARDIAC CATHETERIZATION  12/10/2014    Cardiac cath 38731 (3) - Patent right coronary artery stent. MInimal nonobstructive disease in the LAD and left circumflex.   • CARDIAC SURGERY      valve   • ENDOSCOPY  10/23/2007    Colon endoscopy 89914 (1) - remarkably poor [rep potentially obscures any small polyps that might be present. Colonic mucosa appeared entirely NRL. There were no masses or polyps found   • EXCISION LESION  07/31/2015    Destruction of Premalignant Lesion (1st) 29549 (1) - CYNTHIA POWELL (FP)   • EXCISION LESION  07/31/2015    Destruction of Premalignant Lesions (2-14) 04088 (1) - CYNTHIA POWELL (FP)   • INGUINAL HERNIA REPAIR  11/05/2007    Inguinal hernia, repair (1) - Sutured right inguinal hernia repair. Direct inguinal hernia, recurring in previous mesh repair   • KNEE SURGERY  08/06/1985    Knee Surgery (1) - Debridement, arthroscopy, abrasive chondoplasty. Degenerative chondromalacia, medial femoral condyle, left knee   • VASECTOMY  03/24/1996    Vasectomy - ligate (1) - elective     Past Medical History:   Diagnosis Date   • Actinic keratosis    • Acute bronchitis    • Acute upper respiratory  infection     Acute upper respiratory infection, unspecified   • Aortic valve stenosis     Aortic valve stenosis - post AVR   • Asthma    • Atrial fibrillation     Atrial fibrillation - RSR post MAZE   • Bicuspid aortic valve     Bicuspid aortic valve - AVR replacement 12/16/14   • Coronary arteriosclerosis    • Coronary atherosclerosis of native coronary artery     Coronary atherosclerosis of native coronary artery - S/P 3.0 x 23mm Xience V stent to mid RCA 12/11 (NSTEMI   • Cortical senile cataract    • Crushing injury     Crushing injury - left 4th finger   • Diabetes mellitus     Diabetes mellitus - no retinopathy on today's exam   • 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   • History of echocardiogram 12/14/2011    Echocardiogram W/O color flow 15990 (1) - Normal LV systolic function with diastolic dysfunction. Biscupid aortic valve w/mild to moderate aortic stenosis and mild aortic insufficiency. Mild left atrial enlargement.   • Hyperlipidemia     Hyperlipidemia - cannot tolerate higher dose than 20 mg lipitor   • Hypermetropia    • Inguinal hernia, recurrent     Inguinal hernia - recurrent   • Microalbuminuria    • Need for immunization against influenza     Needs influenza immunization   • Postoperative pain     Postoperative pain - 12/16/14 AVR Medtronic concave Aortic 25 mm Mosaic [id number] model [id number] serial [id number] MAZE Resolved   • Postoperative visit     Postoperative visit - 12/16/14 AVR Medtronic concave Aortic 25 mm Mosaic [id number] model [id number] serial [id number]  MAZE   • Scalding pain on urination    • Type 2 diabetes mellitus      Social History     Social History   • Marital status:      Spouse name: N/A   • Number of children: N/A   • Years of education: N/A     Occupational History   • Not on file.     Social History Main Topics   • Smoking status: Former Smoker   • Smokeless tobacco: Never Used   • Alcohol use Yes      Comment: 1 beer daily   • Drug use: No   • Sexual activity: Not on file      Comment:      Other Topics Concern   • Not on file     Social History Narrative     Allergies   Allergen Reactions   • Lipitor [Atorvastatin] Myalgia   • Lisinopril Cough       Home Medications:  Prior to Admission medications    Medication Sig Start Date End Date Taking? Authorizing Provider   celecoxib (CELEBREX) 50 MG capsule Take 1 capsule by mouth 2 (Two) Times a Day. 9/29/17  Yes Dana Squires MD   fenofibrate 160 MG tablet Take 1 tablet by mouth Daily. 11/8/17   Ashvin Peterson MD   fluticasone (FLONASE) 50 MCG/ACT nasal spray 2 sprays into each nostril daily. Med Name: fluticasone 50 mcg/actuation nasal spray,suspension    Historical Provider, MD   glipiZIDE (GLUCOTROL) 10 MG tablet TAKE 1 TABLET BY MOUTH TWICE DAILY 9/29/17   Ashvin Peterson MD   hydrochlorothiazide (HYDRODIURIL) 25 MG tablet Take 1 tablet by mouth Daily. 9/29/17   Dana Squires MD   insulin glargine (LANTUS) 100 UNIT/ML injection Inject 14 Units under the skin Daily. Med Name: Lantus 100 unit/mL subcutaneous solution 5/11/17   Ashvin Peterson MD   metFORMIN (GLUCOPHAGE) 850 MG tablet TAKE 1 TABLET BY MOUTH THREE TIMES A DAY WITH MEALS 2/13/17   Ashvin Peterson MD   Multiple Vitamins-Iron (DAILY MULTI-VITAMINS/IRON PO) Take 1 tablet by mouth daily.    Historical Provider, MD       Review of Systems   Constitutional: Negative.    HENT: Negative for hearing loss, nosebleeds and trouble swallowing.    Respiratory: Negative for apnea, chest tightness and shortness of breath.     Cardiovascular: Negative for chest pain and palpitations.   Gastrointestinal: Negative for abdominal distention, abdominal pain, blood in stool, constipation, diarrhea, nausea and vomiting.   Genitourinary: Negative for difficulty urinating, dysuria, frequency and urgency.   Musculoskeletal: Negative for back pain, joint swelling and neck pain.   Skin: Negative for rash.   Neurological: Negative for dizziness, seizures, weakness, light-headedness, numbness and headaches.   Hematological: Negative for adenopathy.   Psychiatric/Behavioral: Negative for agitation. The patient is not nervous/anxious.        Vitals:    11/20/17 0927   BP: 139/83   Pulse: 96   Resp: 18   Temp: 97.8 °F (36.6 °C)   SpO2: 97%       Physical Exam   Constitutional: He is oriented to person, place, and time. He appears well-developed and well-nourished. No distress.   HENT:   Head: Normocephalic and atraumatic.   Nose: Nose normal.   Eyes: Conjunctivae are normal.   Neck: Normal range of motion. No tracheal deviation present. No thyromegaly present.   Cardiovascular: Normal rate, regular rhythm and normal heart sounds.    No murmur heard.  Pulmonary/Chest: Effort normal and breath sounds normal. No respiratory distress. He has no wheezes. He has no rales. He exhibits no tenderness.   Abdominal: Soft. He exhibits no distension. There is no tenderness. There is no rebound and no guarding. No hernia.   Musculoskeletal: He exhibits no tenderness or deformity.   Neurological: He is alert and oriented to person, place, and time.   Skin: Skin is warm and dry. No rash noted.   Psychiatric: He has a normal mood and affect. His behavior is normal. Judgment and thought content normal.   Vitals reviewed.      Assessment     In need of screening colonoscopy.    Plan     Risks, benefits, rationale and prep for colonoscopy have been discussed with the patient.  The patient indicates understanding of these issues and agrees with the  plan.

## 2017-12-19 RX ORDER — INSULIN GLARGINE 100 [IU]/ML
INJECTION, SOLUTION SUBCUTANEOUS
Qty: 10 ML | Refills: 2 | Status: SHIPPED | OUTPATIENT
Start: 2017-12-19 | End: 2018-05-10 | Stop reason: SDUPTHER

## 2018-05-10 ENCOUNTER — OFFICE VISIT (OUTPATIENT)
Dept: FAMILY MEDICINE CLINIC | Facility: CLINIC | Age: 68
End: 2018-05-10

## 2018-05-10 VITALS
DIASTOLIC BLOOD PRESSURE: 70 MMHG | HEIGHT: 69 IN | WEIGHT: 192 LBS | OXYGEN SATURATION: 98 % | HEART RATE: 52 BPM | SYSTOLIC BLOOD PRESSURE: 128 MMHG | BODY MASS INDEX: 28.44 KG/M2

## 2018-05-10 DIAGNOSIS — R80.9 TYPE 2 DIABETES MELLITUS WITH MICROALBUMINURIA, WITH LONG-TERM CURRENT USE OF INSULIN (HCC): ICD-10-CM

## 2018-05-10 DIAGNOSIS — E11.29 TYPE 2 DIABETES MELLITUS WITH MICROALBUMINURIA, WITH LONG-TERM CURRENT USE OF INSULIN (HCC): ICD-10-CM

## 2018-05-10 DIAGNOSIS — I25.10 ATHEROSCLEROSIS OF NATIVE CORONARY ARTERY OF NATIVE HEART WITHOUT ANGINA PECTORIS: ICD-10-CM

## 2018-05-10 DIAGNOSIS — I10 ESSENTIAL HYPERTENSION: ICD-10-CM

## 2018-05-10 DIAGNOSIS — I48.0 PAROXYSMAL ATRIAL FIBRILLATION (HCC): Primary | ICD-10-CM

## 2018-05-10 DIAGNOSIS — E78.00 PURE HYPERCHOLESTEROLEMIA: ICD-10-CM

## 2018-05-10 DIAGNOSIS — Z79.4 TYPE 2 DIABETES MELLITUS WITH MICROALBUMINURIA, WITH LONG-TERM CURRENT USE OF INSULIN (HCC): ICD-10-CM

## 2018-05-10 PROCEDURE — 99214 OFFICE O/P EST MOD 30 MIN: CPT | Performed by: FAMILY MEDICINE

## 2018-05-10 RX ORDER — GLIPIZIDE 10 MG/1
10 TABLET ORAL 2 TIMES DAILY
Qty: 180 TABLET | Refills: 3 | Status: SHIPPED | OUTPATIENT
Start: 2018-05-10 | End: 2021-02-20 | Stop reason: HOSPADM

## 2018-05-10 RX ORDER — INSULIN GLARGINE 100 [IU]/ML
30 INJECTION, SOLUTION SUBCUTANEOUS DAILY
Qty: 10 ML | Refills: 2 | Status: SHIPPED | OUTPATIENT
Start: 2018-05-10 | End: 2018-05-16 | Stop reason: SDUPTHER

## 2018-05-10 RX ORDER — FENOFIBRATE 160 MG/1
160 TABLET ORAL DAILY
Qty: 90 TABLET | Refills: 2 | Status: SHIPPED | OUTPATIENT
Start: 2018-05-10 | End: 2019-02-08 | Stop reason: SDUPTHER

## 2018-05-10 RX ORDER — LOSARTAN POTASSIUM 50 MG/1
50 TABLET ORAL DAILY
Qty: 90 TABLET | Refills: 2 | Status: SHIPPED | OUTPATIENT
Start: 2018-05-10 | End: 2019-02-08 | Stop reason: SDUPTHER

## 2018-05-10 RX ORDER — CELECOXIB 50 MG/1
50 CAPSULE ORAL 2 TIMES DAILY
Qty: 180 CAPSULE | Refills: 2 | Status: SHIPPED | OUTPATIENT
Start: 2018-05-10 | End: 2018-05-16 | Stop reason: SINTOL

## 2018-05-10 NOTE — PROGRESS NOTES
Subjective   Kiran Moss is a 67 y.o. male.     Hypertension   This is a chronic problem. The current episode started more than 1 year ago. The problem is unchanged. The problem is controlled (bp 130-132/70-75). Associated symptoms include peripheral edema. Pertinent negatives include no chest pain, orthopnea, palpitations, PND or shortness of breath.   Diabetes   He presents for his follow-up diabetic visit. He has type 2 diabetes mellitus. His disease course has been stable. Pertinent negatives for hypoglycemia include no dizziness. Pertinent negatives for diabetes include no chest pain, no foot paresthesias, no foot ulcerations, no polydipsia and no polyuria. He monitors urine at home 1-2 x per day. His breakfast blood glucose is taken between 7-8 am. His breakfast blood glucose range is generally 140-180 mg/dl. His highest blood glucose is >200 mg/dl. An ACE inhibitor/angiotensin II receptor blocker is being taken. Eye exam is current.   Hyperlipidemia   This is a chronic problem. The current episode started more than 1 year ago. The problem is uncontrolled. Associated symptoms include myalgias. Pertinent negatives include no chest pain or shortness of breath.   Coronary Artery Disease   Presents for follow-up visit. Pertinent negatives include no chest pain, chest tightness, dizziness, leg swelling, palpitations or shortness of breath. Risk factors include hyperlipidemia.   Atrial Fibrillation   Presents for follow-up visit. Symptoms are negative for chest pain, dizziness, palpitations, shortness of breath, syncope and tachycardia. Past medical history includes atrial fibrillation, CAD and hyperlipidemia.        The following portions of the patient's history were reviewed and updated as appropriate: allergies, current medications, past family history, past medical history, past social history, past surgical history and problem list.    Review of Systems   Respiratory: Negative for chest tightness and  shortness of breath.    Cardiovascular: Negative for chest pain, palpitations, orthopnea, leg swelling, syncope and PND.   Endocrine: Negative for polydipsia and polyuria.   Musculoskeletal: Positive for myalgias.   Neurological: Negative for dizziness.       Objective   Physical Exam   Constitutional: He is oriented to person, place, and time. He appears well-developed and well-nourished. No distress.   HENT:   Head: Normocephalic and atraumatic.   Cardiovascular: Normal rate, regular rhythm and intact distal pulses.  Exam reveals no gallop and no friction rub.    Murmur heard.   Systolic murmur is present with a grade of 2/6   Pulmonary/Chest: Effort normal and breath sounds normal. No respiratory distress. He has no wheezes. He has no rales. He exhibits no tenderness.   Musculoskeletal: He exhibits no edema.    Kiran had a diabetic foot exam performed (calus noted) today.   During the foot exam he had a monofilament test performed (normal).  Vascular Status -  His right foot exhibits abnormal foot vasculature . His right foot exhibits no edema. His left foot exhibits abnormal foot vasculature . His left foot exhibits no edema.  Neurological: He is alert and oriented to person, place, and time.   Skin: Skin is warm and dry. He is not diaphoretic.   Psychiatric: He has a normal mood and affect. His behavior is normal. Judgment and thought content normal.   Nursing note and vitals reviewed.      Assessment/Plan   Problems Addressed this Visit        Cardiovascular and Mediastinum    Hyperlipidemia (Chronic)    Relevant Medications    fenofibrate 160 MG tablet    Essential hypertension (Chronic)    Relevant Medications    losartan (COZAAR) 50 MG tablet    Coronary atherosclerosis of native coronary artery (Chronic)    Atrial fibrillation - Primary (Chronic)       Endocrine    Diabetes mellitus    Relevant Medications    glipiZIDE (GLUCOTROL) 10 MG tablet    insulin glargine (LANTUS) 100 UNIT/ML injection      Other  Visit Diagnoses    None.

## 2018-05-16 ENCOUNTER — TELEPHONE (OUTPATIENT)
Dept: FAMILY MEDICINE CLINIC | Facility: CLINIC | Age: 68
End: 2018-05-16

## 2018-05-16 ENCOUNTER — APPOINTMENT (OUTPATIENT)
Dept: LAB | Facility: HOSPITAL | Age: 68
End: 2018-05-16

## 2018-05-16 DIAGNOSIS — R94.4 ABNORMAL RENAL FUNCTION TEST: Primary | ICD-10-CM

## 2018-05-16 LAB
ALBUMIN SERPL-MCNC: 4.3 G/DL (ref 3.4–4.8)
ALBUMIN UR-MCNC: 10.5 MG/L
ALBUMIN/GLOB SERPL: 1.3 G/DL (ref 1.1–1.8)
ALP SERPL-CCNC: 41 U/L (ref 38–126)
ALT SERPL W P-5'-P-CCNC: 44 U/L (ref 21–72)
ANION GAP SERPL CALCULATED.3IONS-SCNC: 10 MMOL/L (ref 5–15)
ARTICHOKE IGE QN: 110 MG/DL (ref 1–129)
AST SERPL-CCNC: 54 U/L (ref 17–59)
BILIRUB SERPL-MCNC: 0.3 MG/DL (ref 0.2–1.3)
BUN BLD-MCNC: 24 MG/DL (ref 7–21)
BUN/CREAT SERPL: 16.4 (ref 7–25)
CALCIUM SPEC-SCNC: 9.9 MG/DL (ref 8.4–10.2)
CHLORIDE SERPL-SCNC: 102 MMOL/L (ref 95–110)
CHOLEST SERPL-MCNC: 182 MG/DL (ref 0–199)
CO2 SERPL-SCNC: 27 MMOL/L (ref 22–31)
CREAT BLD-MCNC: 1.46 MG/DL (ref 0.7–1.3)
CREAT UR-MCNC: 129.2 MG/DL
GFR SERPL CREATININE-BSD FRML MDRD: 48 ML/MIN/1.73 (ref 60–113)
GLOBULIN UR ELPH-MCNC: 3.3 GM/DL (ref 2.3–3.5)
GLUCOSE BLD-MCNC: 112 MG/DL (ref 60–100)
HBA1C MFR BLD: 9.2 % (ref 4–5.6)
HDLC SERPL-MCNC: 28 MG/DL (ref 60–200)
LDLC/HDLC SERPL: 3.89 {RATIO} (ref 0–3.55)
MICROALBUMIN/CREAT UR: 81.3 MG/G (ref 0–30)
POTASSIUM BLD-SCNC: 4.4 MMOL/L (ref 3.5–5.1)
PROT SERPL-MCNC: 7.6 G/DL (ref 6.3–8.6)
SODIUM BLD-SCNC: 139 MMOL/L (ref 137–145)
TRIGL SERPL-MCNC: 226 MG/DL (ref 20–199)

## 2018-05-16 PROCEDURE — 80061 LIPID PANEL: CPT | Performed by: FAMILY MEDICINE

## 2018-05-16 PROCEDURE — 82570 ASSAY OF URINE CREATININE: CPT | Performed by: FAMILY MEDICINE

## 2018-05-16 PROCEDURE — 83036 HEMOGLOBIN GLYCOSYLATED A1C: CPT | Performed by: FAMILY MEDICINE

## 2018-05-16 PROCEDURE — 80053 COMPREHEN METABOLIC PANEL: CPT | Performed by: FAMILY MEDICINE

## 2018-05-16 PROCEDURE — 82043 UR ALBUMIN QUANTITATIVE: CPT | Performed by: FAMILY MEDICINE

## 2018-05-16 PROCEDURE — 36415 COLL VENOUS BLD VENIPUNCTURE: CPT | Performed by: FAMILY MEDICINE

## 2018-05-16 RX ORDER — INSULIN GLARGINE 100 [IU]/ML
38 INJECTION, SOLUTION SUBCUTANEOUS DAILY
Qty: 11 ML | Refills: 5 | Status: SHIPPED | OUTPATIENT
Start: 2018-05-16

## 2018-05-16 NOTE — TELEPHONE ENCOUNTER
-Pr Dr. Peterson, Mr. Moss has been called with his recent lab results & recommendations.  Continue his current medications and follow-up as planned or sooner if any problems.    Med List up dated and lab ordered  Appt Booked      ---- Message from Ashvin Peterson MD sent at 5/16/2018  2:22 PM CDT -----  Cholesterol still slightly elevated but  improved from last visit.  With his allergies, no new recommendations at this time.  Glucose is not controlled.  His hemoglobin A1c is 9.2.  Also, his kidneys are stressed. RECommended he stop the Celebrex and avoid all nonsteroidal anti-inflammatories.  We'll need to stop the metformin at this time also.  Increase his Lantus from 30 units a day to 38 units a day.  In one week call me with his fasting and 4 PM glucoses that he needs to be done daily.  Recheck renal function panel in 2 weeks.

## 2018-05-25 ENCOUNTER — LAB (OUTPATIENT)
Dept: LAB | Facility: HOSPITAL | Age: 68
End: 2018-05-25

## 2018-05-25 DIAGNOSIS — R94.4 ABNORMAL RENAL FUNCTION TEST: ICD-10-CM

## 2018-05-25 LAB
ALBUMIN SERPL-MCNC: 4.5 G/DL (ref 3.4–4.8)
ANION GAP SERPL CALCULATED.3IONS-SCNC: 11 MMOL/L (ref 5–15)
BUN BLD-MCNC: 22 MG/DL (ref 7–21)
BUN/CREAT SERPL: 17.7 (ref 7–25)
CALCIUM SPEC-SCNC: 9.6 MG/DL (ref 8.4–10.2)
CHLORIDE SERPL-SCNC: 106 MMOL/L (ref 95–110)
CO2 SERPL-SCNC: 27 MMOL/L (ref 22–31)
CREAT BLD-MCNC: 1.24 MG/DL (ref 0.7–1.3)
GFR SERPL CREATININE-BSD FRML MDRD: 58 ML/MIN/1.73 (ref 49–113)
GLUCOSE BLD-MCNC: 62 MG/DL (ref 60–100)
PHOSPHATE SERPL-MCNC: 3.8 MG/DL (ref 2.4–4.4)
POTASSIUM BLD-SCNC: 4.1 MMOL/L (ref 3.5–5.1)
SODIUM BLD-SCNC: 144 MMOL/L (ref 137–145)

## 2018-05-25 PROCEDURE — 80069 RENAL FUNCTION PANEL: CPT

## 2018-05-25 PROCEDURE — 36415 COLL VENOUS BLD VENIPUNCTURE: CPT

## 2018-05-29 NOTE — PROGRESS NOTES
Renal function has improved.  Still not quite back to normal but at this point discontinued to avoid Celebrex in all nonsteroidal anti-inflammatories.  Recheck renal function panel in 1 month

## 2018-05-31 ENCOUNTER — TELEPHONE (OUTPATIENT)
Dept: FAMILY MEDICINE CLINIC | Facility: CLINIC | Age: 68
End: 2018-05-31

## 2018-05-31 DIAGNOSIS — R79.9 ELEVATED BUN: Primary | ICD-10-CM

## 2018-05-31 NOTE — TELEPHONE ENCOUNTER
-Pr Dr. Peterson, Mr. Moss has been called with his recent lab results & recommendations.  Continue his current medications and follow-up as planned or sooner if any problems.    Labs ordered for 1 month      ---- Message from Ashvin Peterson MD sent at 5/29/2018  4:08 PM CDT -----  Renal function has improved.  Still not quite back to normal but at this point discontinued to avoid Celebrex in all nonsteroidal anti-inflammatories.  Recheck renal function panel in 1 month     decreased strength/pain/decreased ROM

## 2018-05-31 NOTE — TELEPHONE ENCOUNTER
Pr Dr. Peterson, Mr. Moss has been called with his recent lab results & recommendations.  Continue his current medications and follow-up as planned or sooner if any problems.    Labs ordered for 1 month      ----- Message from Ashvin Peterson MD sent at 5/29/2018  4:08 PM CDT -----  Renal function has improved.  Still not quite back to normal but at this point discontinued to avoid Celebrex in all nonsteroidal anti-inflammatories.  Recheck renal function panel in 1 month

## 2018-05-31 NOTE — PROGRESS NOTES
Pr Dr. Peterson, Mr. Moss has been called with his recent lab results & recommendations.  Continue his current medications and follow-up as planned or sooner if any problems.    Labs ordered for 1 month

## 2018-06-04 ENCOUNTER — OFFICE VISIT (OUTPATIENT)
Dept: FAMILY MEDICINE CLINIC | Facility: CLINIC | Age: 68
End: 2018-06-04

## 2018-06-04 VITALS
DIASTOLIC BLOOD PRESSURE: 76 MMHG | WEIGHT: 187 LBS | HEART RATE: 51 BPM | SYSTOLIC BLOOD PRESSURE: 136 MMHG | OXYGEN SATURATION: 98 % | BODY MASS INDEX: 27.7 KG/M2 | HEIGHT: 69 IN

## 2018-06-04 DIAGNOSIS — E11.22 TYPE 2 DIABETES MELLITUS WITH STAGE 3 CHRONIC KIDNEY DISEASE, WITH LONG-TERM CURRENT USE OF INSULIN (HCC): Primary | ICD-10-CM

## 2018-06-04 DIAGNOSIS — N18.30 TYPE 2 DIABETES MELLITUS WITH STAGE 3 CHRONIC KIDNEY DISEASE, WITH LONG-TERM CURRENT USE OF INSULIN (HCC): Primary | ICD-10-CM

## 2018-06-04 DIAGNOSIS — N18.30 STAGE 3 CHRONIC KIDNEY DISEASE (HCC): ICD-10-CM

## 2018-06-04 DIAGNOSIS — Z79.4 TYPE 2 DIABETES MELLITUS WITH STAGE 3 CHRONIC KIDNEY DISEASE, WITH LONG-TERM CURRENT USE OF INSULIN (HCC): Primary | ICD-10-CM

## 2018-06-04 PROCEDURE — 99213 OFFICE O/P EST LOW 20 MIN: CPT | Performed by: FAMILY MEDICINE

## 2018-06-04 RX ORDER — ASPIRIN 81 MG/1
81 TABLET ORAL DAILY
Status: ON HOLD | COMMUNITY
End: 2021-02-19

## 2018-06-04 NOTE — PROGRESS NOTES
Subjective   Kiran Moss is a 67 y.o. male.     Diabetes   He presents for his follow-up diabetic visit. He has type 2 diabetes mellitus. Pertinent negatives for diabetes include no foot paresthesias, no foot ulcerations and no polydipsia. There is no change in his home blood glucose trend. His breakfast blood glucose range is generally 130-140 mg/dl.   Chronic Kidney Disease   This is a new problem. The current episode started more than 1 month ago. The problem occurs constantly. The problem has been gradually improving.        The following portions of the patient's history were reviewed and updated as appropriate: allergies, current medications, past family history, past medical history, past social history, past surgical history and problem list.    Review of Systems   Endocrine: Negative for polydipsia.       Objective   Physical Exam   Constitutional: He is oriented to person, place, and time. He appears well-developed and well-nourished. No distress.   HENT:   Head: Normocephalic and atraumatic.   Neurological: He is alert and oriented to person, place, and time.   Skin: Skin is warm and dry. He is not diaphoretic.   Psychiatric: He has a normal mood and affect. His behavior is normal. Judgment and thought content normal.   Nursing note and vitals reviewed.      Assessment/Plan   Problems Addressed this Visit        Endocrine    Diabetes mellitus - Primary      Other Visit Diagnoses     Stage 3 chronic kidney disease            Cr increased Celebrex dc and inc fluids.  Cr improving. Avoid NSAIDAs. Metformin discontinued.

## 2018-07-03 ENCOUNTER — LAB (OUTPATIENT)
Dept: LAB | Facility: HOSPITAL | Age: 68
End: 2018-07-03

## 2018-07-03 DIAGNOSIS — R79.9 ELEVATED BUN: ICD-10-CM

## 2018-07-03 LAB
ALBUMIN SERPL-MCNC: 4.5 G/DL (ref 3.4–4.8)
ANION GAP SERPL CALCULATED.3IONS-SCNC: 11 MMOL/L (ref 5–15)
BUN BLD-MCNC: 21 MG/DL (ref 7–21)
BUN/CREAT SERPL: 15.9 (ref 7–25)
CALCIUM SPEC-SCNC: 9.4 MG/DL (ref 8.4–10.2)
CHLORIDE SERPL-SCNC: 106 MMOL/L (ref 95–110)
CO2 SERPL-SCNC: 26 MMOL/L (ref 22–31)
CREAT BLD-MCNC: 1.32 MG/DL (ref 0.7–1.3)
GFR SERPL CREATININE-BSD FRML MDRD: 54 ML/MIN/1.73 (ref 49–113)
GLUCOSE BLD-MCNC: 135 MG/DL (ref 60–100)
PHOSPHATE SERPL-MCNC: 3.1 MG/DL (ref 2.4–4.4)
POTASSIUM BLD-SCNC: 4.6 MMOL/L (ref 3.5–5.1)
SODIUM BLD-SCNC: 143 MMOL/L (ref 137–145)

## 2018-07-03 PROCEDURE — 36415 COLL VENOUS BLD VENIPUNCTURE: CPT

## 2018-07-03 PROCEDURE — 80069 RENAL FUNCTION PANEL: CPT

## 2018-07-05 ENCOUNTER — TELEPHONE (OUTPATIENT)
Dept: FAMILY MEDICINE CLINIC | Facility: CLINIC | Age: 68
End: 2018-07-05

## 2018-07-05 PROBLEM — N18.30 STAGE 3 CHRONIC KIDNEY DISEASE (HCC): Status: ACTIVE | Noted: 2018-07-05

## 2018-07-05 NOTE — TELEPHONE ENCOUNTER
Pr Dr. Peterson, Mr. Moss has been called with his recent lab results & recommendations.  Continue his current medications and follow-up as planned or sooner if any problems.      ----- Message from Ashvin Peterson MD sent at 7/5/2018  3:21 PM CDT -----  Renal function stable with stable at chronic kidney disease stage III

## 2018-07-05 NOTE — PROGRESS NOTES
Pr Dr. Peterson, Mr. Moss has been called with his recent lab results & recommendations.  Continue his current medications and follow-up as planned or sooner if any problems.

## 2018-08-08 ENCOUNTER — TELEPHONE (OUTPATIENT)
Dept: FAMILY MEDICINE CLINIC | Facility: CLINIC | Age: 68
End: 2018-08-08

## 2018-08-13 ENCOUNTER — OFFICE VISIT (OUTPATIENT)
Dept: FAMILY MEDICINE CLINIC | Facility: CLINIC | Age: 68
End: 2018-08-13

## 2018-08-13 VITALS
SYSTOLIC BLOOD PRESSURE: 160 MMHG | OXYGEN SATURATION: 99 % | HEIGHT: 69 IN | HEART RATE: 45 BPM | BODY MASS INDEX: 28.14 KG/M2 | WEIGHT: 190 LBS | DIASTOLIC BLOOD PRESSURE: 70 MMHG

## 2018-08-13 DIAGNOSIS — I10 ESSENTIAL HYPERTENSION: ICD-10-CM

## 2018-08-13 DIAGNOSIS — I48.0 PAROXYSMAL ATRIAL FIBRILLATION (HCC): ICD-10-CM

## 2018-08-13 DIAGNOSIS — Z79.4 TYPE 2 DIABETES MELLITUS WITH STAGE 3 CHRONIC KIDNEY DISEASE, WITH LONG-TERM CURRENT USE OF INSULIN (HCC): ICD-10-CM

## 2018-08-13 DIAGNOSIS — Z00.00 MEDICARE ANNUAL WELLNESS VISIT, INITIAL: ICD-10-CM

## 2018-08-13 DIAGNOSIS — R00.1 BRADYCARDIA: Primary | ICD-10-CM

## 2018-08-13 DIAGNOSIS — I25.10 ATHEROSCLEROSIS OF NATIVE CORONARY ARTERY OF NATIVE HEART WITHOUT ANGINA PECTORIS: ICD-10-CM

## 2018-08-13 DIAGNOSIS — M25.561 CHRONIC PAIN OF RIGHT KNEE: ICD-10-CM

## 2018-08-13 DIAGNOSIS — N18.30 TYPE 2 DIABETES MELLITUS WITH STAGE 3 CHRONIC KIDNEY DISEASE, WITH LONG-TERM CURRENT USE OF INSULIN (HCC): ICD-10-CM

## 2018-08-13 DIAGNOSIS — G89.29 CHRONIC PAIN OF RIGHT KNEE: ICD-10-CM

## 2018-08-13 DIAGNOSIS — E11.22 TYPE 2 DIABETES MELLITUS WITH STAGE 3 CHRONIC KIDNEY DISEASE, WITH LONG-TERM CURRENT USE OF INSULIN (HCC): ICD-10-CM

## 2018-08-13 PROCEDURE — G0438 PPPS, INITIAL VISIT: HCPCS | Performed by: FAMILY MEDICINE

## 2018-08-13 PROCEDURE — 99214 OFFICE O/P EST MOD 30 MIN: CPT | Performed by: FAMILY MEDICINE

## 2018-08-13 RX ORDER — BLOOD-GLUCOSE METER
EACH MISCELLANEOUS
Qty: 1 EACH | Refills: 5 | Status: SHIPPED | OUTPATIENT
Start: 2018-08-13

## 2018-08-13 RX ORDER — INSULIN GLARGINE 100 [IU]/ML
INJECTION, SOLUTION SUBCUTANEOUS
Qty: 10 ML | Refills: 0 | Status: ON HOLD | OUTPATIENT
Start: 2018-08-13 | End: 2021-02-19

## 2018-08-13 NOTE — PROGRESS NOTES
Subjective   Kiran Moss is a 68 y.o. male.     Hypertension   This is a chronic problem. The current episode started more than 1 year ago. The problem is unchanged. The problem is controlled (bp 130-132/70-75). Associated symptoms include malaise/fatigue and peripheral edema. Pertinent negatives include no chest pain, orthopnea, palpitations, PND or shortness of breath.   Diabetes   He presents for his follow-up diabetic visit. He has type 2 diabetes mellitus. His disease course has been stable. Pertinent negatives for hypoglycemia include no dizziness. Pertinent negatives for diabetes include no chest pain, no foot paresthesias, no foot ulcerations, no polydipsia and no polyuria. He monitors urine at home 1-2 x per day. His breakfast blood glucose is taken between 6-7 am. His breakfast blood glucose range is generally 70-90 mg/dl. An ACE inhibitor/angiotensin II receptor blocker is being taken. Eye exam is current.   Hyperlipidemia   This is a chronic problem. The current episode started more than 1 year ago. The problem is uncontrolled. Recent lipid tests were reviewed and are high. Pertinent negatives include no chest pain, myalgias or shortness of breath.   Coronary Artery Disease   Presents for follow-up visit. Pertinent negatives include no chest pain, chest pressure, chest tightness, dizziness, leg swelling, palpitations or shortness of breath. Risk factors include hyperlipidemia.   Atrial Fibrillation   Presents for follow-up visit. Symptoms are negative for chest pain, dizziness, palpitations, shortness of breath, syncope and tachycardia. Past medical history includes atrial fibrillation, CAD and hyperlipidemia.        The following portions of the patient's history were reviewed and updated as appropriate: allergies, current medications, past family history, past medical history, past social history, past surgical history and problem list.    Review of Systems   Constitutional: Positive for  malaise/fatigue.   Respiratory: Negative for chest tightness and shortness of breath.    Cardiovascular: Negative for chest pain, palpitations, orthopnea, leg swelling, syncope and PND.   Endocrine: Negative for polydipsia and polyuria.   Musculoskeletal: Positive for arthralgias. Negative for myalgias.   Neurological: Negative for dizziness.       Objective   Physical Exam   Constitutional: He is oriented to person, place, and time. He appears well-developed and well-nourished. No distress.   HENT:   Head: Normocephalic and atraumatic.   Cardiovascular: Regular rhythm and intact distal pulses.  Bradycardia present.  Exam reveals no gallop and no friction rub.    Murmur heard.   Systolic murmur is present with a grade of 2/6   Pulmonary/Chest: Effort normal and breath sounds normal. No respiratory distress. He has no wheezes. He has no rales. He exhibits no tenderness.   Musculoskeletal: He exhibits no edema.        Right knee: He exhibits normal range of motion and no swelling. Tenderness found. Medial joint line tenderness noted.    Kiran had a diabetic foot exam performed (calus noted) today.   During the foot exam he had a monofilament test performed (normal).  Vascular Status -  His right foot exhibits abnormal foot vasculature . His right foot exhibits no edema. His left foot exhibits abnormal foot vasculature . His left foot exhibits no edema.  Neurological: He is alert and oriented to person, place, and time.   Skin: Skin is warm and dry. He is not diaphoretic.   Psychiatric: He has a normal mood and affect. His behavior is normal. Judgment and thought content normal.   Nursing note and vitals reviewed.      Assessment/Plan   Problems Addressed this Visit        Cardiovascular and Mediastinum    Essential hypertension (Chronic)    Coronary atherosclerosis of native coronary artery (Chronic)    Atrial fibrillation (CMS/HCC) (Chronic)       Endocrine    Type 2 diabetes mellitus with stage 3 chronic kidney  disease (CMS/[facility])      Other Visit Diagnoses     Bradycardia    -  Primary    Medicare annual wellness visit, initial        Chronic pain of right knee                To see Dr Sofi farley  Cannot take NSAIDS and will observe knee for now

## 2018-08-13 NOTE — PROGRESS NOTES
QUICK REFERENCE INFORMATION:  The ABCs of the Annual Wellness Visit    Initial Medicare Wellness Visit    HEALTH RISK ASSESSMENT    1950    Recent Hospitalizations:  No hospitalization(s) within the last year..        Current Medical Providers:  Patient Care Team:  Ashvin Peterson MD as PCP - General  Dana Squires MD as PCP - Claims Attributed  FOREIGN Watkins MD (Urology)        Smoking Status:  History   Smoking Status   • Former Smoker   Smokeless Tobacco   • Never Used       Alcohol Consumption:  History   Alcohol Use   • Yes     Comment: 1 beer daily       Depression Screen:   PHQ-2/PHQ-9 Depression Screening 8/13/2018   Little interest or pleasure in doing things 0   Feeling down, depressed, or hopeless 0   Trouble falling or staying asleep, or sleeping too much 0   Feeling tired or having little energy 0   Poor appetite or overeating 0   Feeling bad about yourself - or that you are a failure or have let yourself or your family down 0   Trouble concentrating on things, such as reading the newspaper or watching television 0   Moving or speaking so slowly that other people could have noticed. Or the opposite - being so fidgety or restless that you have been moving around a lot more than usual 0   Thoughts that you would be better off dead, or of hurting yourself in some way 0   Total Score 0   If you checked off any problems, how difficult have these problems made it for you to do your work, take care of things at home, or get along with other people? Not difficult at all       Health Habits and Functional and Cognitive Screening:  Functional & Cognitive Status 8/13/2018   Do you have difficulty preparing food and eating? No   Do you have difficulty bathing yourself, getting dressed or grooming yourself? No   Do you have difficulty using the toilet? No   Do you have difficulty moving around from place to place? No   Do you have trouble with steps or getting out of a bed or a chair? No   In the past year have  you fallen or experienced a near fall? No   Current Diet Diabetic Diet   Dental Exam Up to date   Eye Exam Not up to date   Exercise (times per week) 0 times per week   Current Exercise Activities Include None   Do you need help using the phone?  No   Are you deaf or do you have serious difficulty hearing?  Yes   Do you need help with transportation? No   Do you need help shopping? No   Do you need help preparing meals?  No   Do you need help with housework?  No   Do you need help with laundry? No   Do you need help taking your medications? No   Do you need help managing money? No   Do you ever drive or ride in a car without wearing a seat belt? No   Have you felt unusual stress, anger or loneliness in the last month? No   Who do you live with? Spouse   If you need help, do you have trouble finding someone available to you? No   Have you been bothered in the last four weeks by sexual problems? No   Do you have difficulty concentrating, remembering or making decisions? No           Does the patient have evidence of cognitive impairment? No    Asiprin use counseling: Taking ASA appropriately as indicated      Recent Lab Results:    Visual Acuity:  Vision Screening Comments: Current eye exam May 2018    Age-appropriate Screening Schedule:  Refer to the list below for future screening recommendations based on patient's age, sex and/or medical conditions. Orders for these recommended tests are listed in the plan section. The patient has been provided with a written plan.    Health Maintenance   Topic Date Due   • ZOSTER VACCINE (1 of 2) 07/06/2000   • DIABETIC EYE EXAM  12/13/2017   • INFLUENZA VACCINE  08/01/2018   • HEMOGLOBIN A1C  11/16/2018   • DIABETIC FOOT EXAM  05/10/2019   • LIPID PANEL  05/16/2019   • URINE MICROALBUMIN  05/16/2019   • TDAP/TD VACCINES (2 - Td) 10/01/2021   • COLONOSCOPY  11/20/2027   • PNEUMOCOCCAL VACCINES (65+ LOW/MEDIUM RISK)  Completed        Subjective   History of Present Illness    Kiran  Juan Moss is a 68 y.o. male who presents for an Annual Wellness Visit.    The following portions of the patient's history were reviewed and updated as appropriate: allergies, current medications, past family history, past medical history, past social history, past surgical history and problem list.    Outpatient Medications Prior to Visit   Medication Sig Dispense Refill   • aspirin 81 MG EC tablet Take 81 mg by mouth Daily.     • fenofibrate 160 MG tablet Take 1 tablet by mouth Daily. 90 tablet 2   • fluticasone (FLONASE) 50 MCG/ACT nasal spray 2 sprays into each nostril daily. Med Name: fluticasone 50 mcg/actuation nasal spray,suspension     • glipiZIDE (GLUCOTROL) 10 MG tablet Take 1 tablet by mouth 2 (Two) Times a Day. 180 tablet 3   • insulin glargine (LANTUS) 100 UNIT/ML injection Inject 38 Units under the skin Daily. 11 mL 5   • losartan (COZAAR) 50 MG tablet Take 1 tablet by mouth Daily. 90 tablet 2   • Multiple Vitamins-Iron (DAILY MULTI-VITAMINS/IRON PO) Take 1 tablet by mouth daily.       No facility-administered medications prior to visit.        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    Review of Systems    Compared to one year ago, the patient feels his physical health is the same.  Compared to one year ago, the patient feels his mental health is the same.    Objective     Physical Exam    Vitals:    08/13/18 0818   BP: 160/70   Pulse: (!) 45   SpO2: 99%   Weight: 86.2 kg (190 lb)   Height: 175.3 cm (69\")   PainSc:   8   PainLoc: Knee       Patient's Body mass index is 28.06 kg/m². BMI is within normal parameters. No follow-up required.      Assessment/Plan   Patient Self-Management and Personalized Health Advice  The patient has been provided with information about: exercise and preventive services including:   · Advance directive.    Visit Diagnoses:  No diagnosis found.    No orders of the defined types were placed in this encounter.      Outpatient Encounter Prescriptions as of 8/13/2018   Medication Sig Dispense Refill   • aspirin 81 MG EC tablet Take 81 mg by mouth Daily.     • fenofibrate 160 MG tablet Take 1 tablet by mouth Daily. 90 tablet 2   • fluticasone (FLONASE) 50 MCG/ACT nasal spray 2 sprays into each nostril daily. Med Name: fluticasone 50 mcg/actuation nasal spray,suspension     • glipiZIDE (GLUCOTROL) 10 MG tablet Take 1 tablet by mouth 2 (Two) Times a Day. 180 tablet 3   • insulin glargine (LANTUS) 100 UNIT/ML injection Inject 38 Units under the skin Daily. 11 mL 5   • losartan (COZAAR) 50 MG tablet Take 1 tablet by mouth Daily. 90 tablet 2   • Multiple Vitamins-Iron (DAILY MULTI-VITAMINS/IRON PO) Take 1 tablet by mouth daily.       No facility-administered encounter medications on file as of 8/13/2018.        Reviewed use of high risk medication in the elderly: yes  Reviewed for potential of harmful drug interactions in the elderly: yes    Follow Up:  No Follow-up on file.     An After Visit Summary and PPPS with all of these plans were given to the patient.           "

## 2019-02-11 RX ORDER — FENOFIBRATE 160 MG/1
160 TABLET ORAL DAILY
Qty: 90 TABLET | Refills: 1 | Status: SHIPPED | OUTPATIENT
Start: 2019-02-11

## 2019-02-11 RX ORDER — LOSARTAN POTASSIUM 50 MG/1
50 TABLET ORAL DAILY
Qty: 90 TABLET | Refills: 1 | Status: SHIPPED | OUTPATIENT
Start: 2019-02-11

## 2020-09-10 ENCOUNTER — APPOINTMENT (OUTPATIENT)
Dept: GENERAL RADIOLOGY | Facility: HOSPITAL | Age: 70
End: 2020-09-10

## 2020-09-10 ENCOUNTER — HOSPITAL ENCOUNTER (EMERGENCY)
Facility: HOSPITAL | Age: 70
Discharge: HOME | End: 2020-09-10
Attending: EMERGENCY MEDICINE | Admitting: EMERGENCY MEDICINE

## 2020-09-10 VITALS
HEIGHT: 68 IN | DIASTOLIC BLOOD PRESSURE: 59 MMHG | TEMPERATURE: 98.9 F | RESPIRATION RATE: 18 BRPM | SYSTOLIC BLOOD PRESSURE: 121 MMHG | WEIGHT: 202 LBS | BODY MASS INDEX: 30.62 KG/M2 | OXYGEN SATURATION: 97 % | HEART RATE: 60 BPM

## 2020-09-10 DIAGNOSIS — R06.00 DYSPNEA, UNSPECIFIED TYPE: ICD-10-CM

## 2020-09-10 DIAGNOSIS — I50.9 CONGESTIVE HEART FAILURE, UNSPECIFIED HF CHRONICITY, UNSPECIFIED HEART FAILURE TYPE (HCC): Primary | ICD-10-CM

## 2020-09-10 LAB
ALBUMIN SERPL-MCNC: 4.1 G/DL (ref 3.5–5.2)
ALBUMIN/GLOB SERPL: 1.4 G/DL
ALP SERPL-CCNC: 54 U/L (ref 39–117)
ALT SERPL W P-5'-P-CCNC: 15 U/L (ref 1–41)
ANION GAP SERPL CALCULATED.3IONS-SCNC: 10 MMOL/L (ref 5–15)
AST SERPL-CCNC: 21 U/L (ref 1–40)
BASOPHILS # BLD AUTO: 0.07 10*3/MM3 (ref 0–0.2)
BASOPHILS NFR BLD AUTO: 0.9 % (ref 0–1.5)
BILIRUB SERPL-MCNC: 0.3 MG/DL (ref 0–1.2)
BUN SERPL-MCNC: 19 MG/DL (ref 8–23)
BUN/CREAT SERPL: 10.4 (ref 7–25)
CALCIUM SPEC-SCNC: 9.3 MG/DL (ref 8.6–10.5)
CHLORIDE SERPL-SCNC: 102 MMOL/L (ref 98–107)
CO2 SERPL-SCNC: 28 MMOL/L (ref 22–29)
CREAT SERPL-MCNC: 1.83 MG/DL (ref 0.76–1.27)
D-DIMER, QUANTITATIVE (MAD,POW, STR): 610 NG/ML (FEU) (ref 0–470)
DEPRECATED RDW RBC AUTO: 47.7 FL (ref 37–54)
EOSINOPHIL # BLD AUTO: 0.52 10*3/MM3 (ref 0–0.4)
EOSINOPHIL NFR BLD AUTO: 6.4 % (ref 0.3–6.2)
ERYTHROCYTE [DISTWIDTH] IN BLOOD BY AUTOMATED COUNT: 15.1 % (ref 12.3–15.4)
GFR SERPL CREATININE-BSD FRML MDRD: 37 ML/MIN/1.73
GLOBULIN UR ELPH-MCNC: 3 GM/DL
GLUCOSE SERPL-MCNC: 153 MG/DL (ref 65–99)
HCT VFR BLD AUTO: 40.7 % (ref 37.5–51)
HGB BLD-MCNC: 12.2 G/DL (ref 13–17.7)
HOLD SPECIMEN: NORMAL
HOLD SPECIMEN: NORMAL
IMM GRANULOCYTES # BLD AUTO: 0.03 10*3/MM3 (ref 0–0.05)
IMM GRANULOCYTES NFR BLD AUTO: 0.4 % (ref 0–0.5)
LYMPHOCYTES # BLD AUTO: 1.77 10*3/MM3 (ref 0.7–3.1)
LYMPHOCYTES NFR BLD AUTO: 21.6 % (ref 19.6–45.3)
MAGNESIUM SERPL-MCNC: 1.7 MG/DL (ref 1.6–2.4)
MCH RBC QN AUTO: 26.3 PG (ref 26.6–33)
MCHC RBC AUTO-ENTMCNC: 30 G/DL (ref 31.5–35.7)
MCV RBC AUTO: 87.7 FL (ref 79–97)
MONOCYTES # BLD AUTO: 0.93 10*3/MM3 (ref 0.1–0.9)
MONOCYTES NFR BLD AUTO: 11.4 % (ref 5–12)
NEUTROPHILS NFR BLD AUTO: 4.86 10*3/MM3 (ref 1.7–7)
NEUTROPHILS NFR BLD AUTO: 59.3 % (ref 42.7–76)
NRBC BLD AUTO-RTO: 0 /100 WBC (ref 0–0.2)
NT-PROBNP SERPL-MCNC: 3028 PG/ML (ref 0–900)
PLATELET # BLD AUTO: 239 10*3/MM3 (ref 140–450)
PMV BLD AUTO: 10.5 FL (ref 6–12)
POTASSIUM SERPL-SCNC: 4.2 MMOL/L (ref 3.5–5.2)
PROT SERPL-MCNC: 7.1 G/DL (ref 6–8.5)
RBC # BLD AUTO: 4.64 10*6/MM3 (ref 4.14–5.8)
SODIUM SERPL-SCNC: 140 MMOL/L (ref 136–145)
TROPONIN T SERPL-MCNC: <0.01 NG/ML (ref 0–0.03)
WBC # BLD AUTO: 8.18 10*3/MM3 (ref 3.4–10.8)
WHOLE BLOOD HOLD SPECIMEN: NORMAL
WHOLE BLOOD HOLD SPECIMEN: NORMAL

## 2020-09-10 PROCEDURE — 93005 ELECTROCARDIOGRAM TRACING: CPT | Performed by: EMERGENCY MEDICINE

## 2020-09-10 PROCEDURE — 85379 FIBRIN DEGRADATION QUANT: CPT | Performed by: EMERGENCY MEDICINE

## 2020-09-10 PROCEDURE — 85025 COMPLETE CBC W/AUTO DIFF WBC: CPT | Performed by: EMERGENCY MEDICINE

## 2020-09-10 PROCEDURE — 99284 EMERGENCY DEPT VISIT MOD MDM: CPT

## 2020-09-10 PROCEDURE — 80053 COMPREHEN METABOLIC PANEL: CPT | Performed by: EMERGENCY MEDICINE

## 2020-09-10 PROCEDURE — 83735 ASSAY OF MAGNESIUM: CPT | Performed by: EMERGENCY MEDICINE

## 2020-09-10 PROCEDURE — 93005 ELECTROCARDIOGRAM TRACING: CPT

## 2020-09-10 PROCEDURE — 25010000002 FUROSEMIDE PER 20 MG: Performed by: EMERGENCY MEDICINE

## 2020-09-10 PROCEDURE — 93010 ELECTROCARDIOGRAM REPORT: CPT | Performed by: INTERNAL MEDICINE

## 2020-09-10 PROCEDURE — 96374 THER/PROPH/DIAG INJ IV PUSH: CPT

## 2020-09-10 PROCEDURE — 96361 HYDRATE IV INFUSION ADD-ON: CPT

## 2020-09-10 PROCEDURE — 71046 X-RAY EXAM CHEST 2 VIEWS: CPT

## 2020-09-10 PROCEDURE — 84484 ASSAY OF TROPONIN QUANT: CPT | Performed by: EMERGENCY MEDICINE

## 2020-09-10 PROCEDURE — 83880 ASSAY OF NATRIURETIC PEPTIDE: CPT | Performed by: EMERGENCY MEDICINE

## 2020-09-10 RX ORDER — METOPROLOL SUCCINATE 100 MG/1
100 TABLET, EXTENDED RELEASE ORAL DAILY
COMMUNITY
Start: 2020-08-25

## 2020-09-10 RX ORDER — FUROSEMIDE 40 MG/1
40 TABLET ORAL DAILY
Qty: 5 TABLET | Refills: 0 | Status: SHIPPED | OUTPATIENT
Start: 2020-09-10 | End: 2021-02-19

## 2020-09-10 RX ORDER — SODIUM CHLORIDE 9 MG/ML
100 INJECTION, SOLUTION INTRAVENOUS CONTINUOUS
Status: DISCONTINUED | OUTPATIENT
Start: 2020-09-10 | End: 2020-09-10 | Stop reason: HOSPADM

## 2020-09-10 RX ORDER — SODIUM CHLORIDE 0.9 % (FLUSH) 0.9 %
10 SYRINGE (ML) INJECTION AS NEEDED
Status: DISCONTINUED | OUTPATIENT
Start: 2020-09-10 | End: 2020-09-10 | Stop reason: HOSPADM

## 2020-09-10 RX ORDER — AMIODARONE HYDROCHLORIDE 200 MG/1
200 TABLET ORAL DAILY
Status: ON HOLD | COMMUNITY
Start: 2020-09-02 | End: 2021-02-19

## 2020-09-10 RX ORDER — FUROSEMIDE 10 MG/ML
40 INJECTION INTRAMUSCULAR; INTRAVENOUS ONCE
Status: COMPLETED | OUTPATIENT
Start: 2020-09-10 | End: 2020-09-10

## 2020-09-10 RX ADMIN — FUROSEMIDE 40 MG: 10 INJECTION, SOLUTION INTRAMUSCULAR; INTRAVENOUS at 11:24

## 2020-09-10 RX ADMIN — SODIUM CHLORIDE 100 ML/HR: 900 INJECTION, SOLUTION INTRAVENOUS at 07:24

## 2020-09-10 NOTE — ED NOTES
This tech ambulated pt. Pt O2 stayed at % while ambulating, Pt HR was 60-65. Pt tolerated walking well. Pt did state that he felt dizzy while ambulating. Pt back in bed hooked up to the monitor.     Amado Rasheed  09/10/20 2070

## 2020-09-10 NOTE — DISCHARGE INSTRUCTIONS
Return ED fever, shortness of air, chest pain, syncope, palpitations, vomiting, dizziness, worse condition, other concerns

## 2020-09-10 NOTE — ED PROVIDER NOTES
Subjective   69yo male pmh significant htnb/dm2/cad/atrial fibrillation/avr/pacemaker presents ED c/o 2d hx soa/orthopnea with occasional palpitations.  ROS (+) 1wk hx nonproductive cough.  Denies fever/chills/hemoptysis/chest pain/syncope/abd pain/vomiting/diarrhea.      History provided by:  Patient  Shortness of Breath   Severity:  Mild  Onset quality:  Sudden  Duration:  2 days  Chronicity:  New  Associated symptoms: cough    Associated symptoms: no chest pain        Review of Systems   Constitutional: Negative.    HENT: Negative.    Eyes: Negative for redness.   Respiratory: Positive for cough and shortness of breath.    Cardiovascular: Positive for palpitations. Negative for chest pain.   Gastrointestinal: Negative.    Genitourinary: Negative.    Musculoskeletal: Negative.    Skin: Negative.    Allergic/Immunologic: Negative for immunocompromised state.   All other systems reviewed and are negative.      Past Medical History:   Diagnosis Date   • Actinic keratosis    • Acute bronchitis    • Acute upper respiratory infection     Acute upper respiratory infection, unspecified   • Aortic valve stenosis     Aortic valve stenosis - post AVR   • Asthma    • Atrial fibrillation (CMS/HCC)     Atrial fibrillation - RSR post MAZE   • Bicuspid aortic valve     Bicuspid aortic valve - AVR replacement 12/16/14   • Coronary arteriosclerosis    • Coronary atherosclerosis of native coronary artery     Coronary atherosclerosis of native coronary artery - S/P 3.0 x 23mm Xience V stent to mid RCA 12/11 (NSTEMI   • Cortical senile cataract    • Crushing injury     Crushing injury - left 4th finger   • Diabetes mellitus (CMS/HCC)     Diabetes mellitus - no retinopathy on today's exam   • 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   • History of echocardiogram 12/14/2011    Echocardiogram W/O color flow 93475 (1) - Normal LV systolic function with diastolic dysfunction. Biscupid aortic valve w/mild to moderate aortic stenosis and mild aortic insufficiency. Mild left atrial enlargement.   • Hyperlipidemia     Hyperlipidemia - cannot tolerate higher dose than 20 mg lipitor   • Hypermetropia    • Inguinal hernia, recurrent     Inguinal hernia - recurrent   • Microalbuminuria    • Need for immunization against influenza     Needs influenza immunization   • Postoperative pain     Postoperative pain - 12/16/14 AVR Medtronic concave Aortic 25 mm Mosaic [id number] model [id number] serial [id number] MAZE Resolved   • Postoperative visit     Postoperative visit - 12/16/14 AVR Medtronic concave Aortic 25 mm Mosaic [id number] model [id number] serial [id number] MAZE   • Scalding pain on urination    • Type 2 diabetes mellitus (CMS/HCC)        Allergies   Allergen Reactions   • Lipitor [Atorvastatin] Myalgia   • Lisinopril Cough       Past Surgical History:   Procedure Laterality Date   • ANGIOPLASTY  07/16/2000    Angioplasty, coronary (1) - wide open patency to anterior descending artery at the inflation sites. No intimal luminal irregularities, tears, or residual stenosis found   • AORTIC VALVE REPAIR/REPLACEMENT  12/16/2014    Replacement of aortic valve (1) - 12/16/14 AVR Medtronic concave Aortic 25 mm Mosaic [id number] model [id number] serial [id number] MAZE   • CARDIAC CATHETERIZATION  12/10/2014    Cardiac cath 50021 (3) - Patent right coronary artery stent. MInimal nonobstructive disease in the LAD and left circumflex.   • CARDIAC SURGERY      valve   • COLONOSCOPY N/A 11/20/2017    Procedure: COLONOSCOPY;  Surgeon:  Rony Pena MD;  Location: [facility] ENDOSCOPY;  Service:    • ENDOSCOPY  10/23/2007    Colon endoscopy 34021 (1) - remarkably poor [rep potentially obscures any small polyps that might be present. Colonic mucosa appeared entirely NRL. There were no masses or polyps found   • EXCISION LESION  07/31/2015    Destruction of Premalignant Lesion (1st) 13174 (1) - CYNTHIA POWELL (FP)   • EXCISION LESION  07/31/2015    Destruction of Premalignant Lesions (2-14) 43742 (1) - CYNTHIA POWELL (FP)   • INGUINAL HERNIA REPAIR  11/05/2007    Inguinal hernia, repair (1) - Sutured right inguinal hernia repair. Direct inguinal hernia, recurring in previous mesh repair   • KNEE SURGERY  08/06/1985    Knee Surgery (1) - Debridement, arthroscopy, abrasive chondoplasty. Degenerative chondromalacia, medial femoral condyle, left knee   • VASECTOMY  03/24/1996    Vasectomy - ligate (1) - elective       Family History   Problem Relation Age of Onset   • Diabetes Other    • Nephrolithiasis Other    • Arthritis Mother    • Diabetes Mother    • Hypertension Mother    • Arthritis Father    • Cancer Father         skin   • Diabetes Father    • Hyperlipidemia Father    • Hypertension Father    • Coronary artery disease Neg Hx        Social History     Socioeconomic History   • Marital status:      Spouse name: Not on file   • Number of children: Not on file   • Years of education: Not on file   • Highest education level: Not on file   Tobacco Use   • Smoking status: Former Smoker   • Smokeless tobacco: Never Used   Substance and Sexual Activity   • Alcohol use: Yes     Comment: 1 beer daily   • Drug use: No           Objective   Physical Exam   Constitutional: He is oriented to person, place, and time. He appears well-developed and well-nourished.   HENT:   Head: Normocephalic and atraumatic.   Eyes: Pupils are equal, round, and reactive to light.   Neck: Normal range of motion. Neck supple. No JVD present. No tracheal deviation present.    Cardiovascular: Normal rate, regular rhythm, normal heart sounds and intact distal pulses. Exam reveals no gallop and no friction rub.   No murmur heard.  Pulmonary/Chest: Effort normal and breath sounds normal. He has no wheezes. He has no rales.   Abdominal: Soft. Bowel sounds are normal. He exhibits no distension and no mass. There is no tenderness. There is no rebound and no guarding.   Musculoskeletal: He exhibits no edema or tenderness.   Lymphadenopathy:     He has no cervical adenopathy.   Neurological: He is alert and oriented to person, place, and time. GCS eye subscore is 4. GCS verbal subscore is 5. GCS motor subscore is 6.   Skin: Skin is warm and dry.   Nursing note and vitals reviewed.      Procedures           ED Course  ED Course as of Sep 10 1322   Thu Sep 10, 2020   1023 Stable ckd creatinine baseline 1.8mg/dl    [SD]   1023 Ekg: paced rhythm/rate 60    [SD]   1032 D/w Dr. Squires (patient cardiologist). Plan discussed et agreed upon. Will plan to add lasix po daily with cardiology outpatient followup 09.14.2020.    [SD]      ED Course User Index  [SD] Uzair Steele MD      Labs Reviewed   COMPREHENSIVE METABOLIC PANEL - Abnormal; Notable for the following components:       Result Value    Glucose 153 (*)     Creatinine 1.83 (*)     eGFR Non  Amer 37 (*)     All other components within normal limits    Narrative:     GFR Normal >60  Chronic Kidney Disease <60  Kidney Failure <15     BNP (IN-HOUSE) - Abnormal; Notable for the following components:    proBNP 3,028.0 (*)     All other components within normal limits    Narrative:     Among patients with dyspnea, NT-proBNP is highly sensitive for the detection of acute congestive heart failure. In addition NT-proBNP of <300 pg/ml effectively rules out acute congestive heart failure with 99% negative predictive value.    Results may be falsely decreased if patient taking Biotin.     CBC WITH AUTO DIFFERENTIAL - Abnormal; Notable for the  following components:    Hemoglobin 12.2 (*)     MCH 26.3 (*)     MCHC 30.0 (*)     Eosinophil % 6.4 (*)     Monocytes, Absolute 0.93 (*)     Eosinophils, Absolute 0.52 (*)     All other components within normal limits   D-DIMER, QUANTITATIVE - Abnormal; Notable for the following components:    D-Dimer, Quantitative 610 (*)     All other components within normal limits    Narrative:     Dimer values <500 ng/ml FEU are FDA approved as aid in diagnosis of deep venous thrombosis and pulmonary embolism.  This test should not be used in an exclusion strategy with pretest probability alone.    A recent guideline regarding diagnosis for pulmonary thromboembolism recommends an adjusted exclusion criterion of age x 10 ng/ml FEU for patients >50 years of age ([employer] Intern Med 2015; 163: 701-711).     TROPONIN (IN-HOUSE) - Normal    Narrative:     Troponin T Reference Range:  <= 0.03 ng/mL-   Negative for AMI  >0.03 ng/mL-     Abnormal for myocardial necrosis.  Clinicians would have to utilize clinical acumen, EKG, Troponin and serial changes to determine if it is an Acute Myocardial Infarction or myocardial injury due to an underlying chronic condition.       Results may be falsely decreased if patient taking Biotin.     MAGNESIUM - Normal   RAINBOW DRAW    Narrative:     The following orders were created for panel order [place] Draw.  Procedure                               Abnormality         Status                     ---------                               -----------         ------                     Light Blue Top[[id number]]                                   Final result               Green Top (Gel)[[id number]]                                  Final result               Lavender Top[[id number]]                                     Final result               Gold Top - SST[[id number]]                                   Final result                 Please view results for these tests on the individual orders.   CBC AND  DIFFERENTIAL    Narrative:     The following orders were created for panel order CBC & Differential.  Procedure                               Abnormality         Status                     ---------                               -----------         ------                     CBC Auto Differential[[id number]]        Abnormal            Final result                 Please view results for these tests on the individual orders.   LIGHT BLUE TOP   GREEN TOP   LAVENDER TOP   GOLD TOP - SST     Xr Chest 2 View    Result Date: 9/10/2020  Narrative: PROCEDURE: Chest PA and lateral REASON FOR EXAM: SOA Triage Protocol FINDINGS: Comparison study dated January 5, 2015.  Postsurgical changes with median sternotomy. Pacemaker and leads extending to the right atrium and right ventricle. Cardiac and pulmonary vasculature are normal . Lungs are clear. Pleural spaces are normal . No acute osseous abnormality.     Impression: 1.  No acute cardiopulmonary abnormality. Electronically signed by:  Joseph Salcido MD  9/10/2020 7:13 AM CDT Workstation: [id number]                                         MDM  Number of Diagnoses or Management Options  Congestive heart failure, unspecified HF chronicity, unspecified heart failure type (CMS/HCC):   Dyspnea, unspecified type:   Diagnosis management comments: Patient resting comfortably. Diuresing well in ED. Ambulates with sao2 99% RA. Stable discharge.       Amount and/or Complexity of Data Reviewed  Clinical lab tests: reviewed  Tests in the radiology section of CPT®: reviewed  Tests in the medicine section of CPT®: reviewed  Decide to obtain previous medical records or to obtain history from someone other than the patient: yes        Final diagnoses:   Congestive heart failure, unspecified HF chronicity, unspecified heart failure type (CMS/HCC)   Dyspnea, unspecified type            Uzair Steele MD  09/10/20 [id number]

## 2020-10-20 ENCOUNTER — DOCUMENTATION (OUTPATIENT)
Dept: CARDIAC REHAB | Facility: HOSPITAL | Age: 70
End: 2020-10-20

## 2021-01-19 ENCOUNTER — IMMUNIZATION (OUTPATIENT)
Dept: VACCINE CLINIC | Facility: HOSPITAL | Age: 71
End: 2021-01-19

## 2021-01-19 PROCEDURE — 91300 HC SARSCOV02 VAC 30MCG/0.3ML IM: CPT | Performed by: NURSE PRACTITIONER

## 2021-01-19 PROCEDURE — 0001A: CPT | Performed by: NURSE PRACTITIONER

## 2021-02-09 ENCOUNTER — IMMUNIZATION (OUTPATIENT)
Dept: VACCINE CLINIC | Facility: HOSPITAL | Age: 71
End: 2021-02-09

## 2021-02-09 PROCEDURE — 0002A: CPT | Performed by: THORACIC SURGERY (CARDIOTHORACIC VASCULAR SURGERY)

## 2021-02-09 PROCEDURE — 91300 HC SARSCOV02 VAC 30MCG/0.3ML IM: CPT | Performed by: THORACIC SURGERY (CARDIOTHORACIC VASCULAR SURGERY)

## 2021-02-19 ENCOUNTER — HOSPITAL ENCOUNTER (OUTPATIENT)
Facility: HOSPITAL | Age: 71
Setting detail: OBSERVATION
Discharge: HOME | End: 2021-02-20
Attending: EMERGENCY MEDICINE | Admitting: FAMILY MEDICINE

## 2021-02-19 ENCOUNTER — APPOINTMENT (OUTPATIENT)
Dept: CARDIOLOGY | Facility: HOSPITAL | Age: 71
End: 2021-02-19

## 2021-02-19 ENCOUNTER — APPOINTMENT (OUTPATIENT)
Dept: GENERAL RADIOLOGY | Facility: HOSPITAL | Age: 71
End: 2021-02-19

## 2021-02-19 DIAGNOSIS — R07.9 CHEST PAIN, RULE OUT ACUTE MYOCARDIAL INFARCTION: Primary | ICD-10-CM

## 2021-02-19 LAB
ALBUMIN SERPL-MCNC: 4.4 G/DL (ref 3.5–5.2)
ALBUMIN/GLOB SERPL: 1.4 G/DL
ALP SERPL-CCNC: 47 U/L (ref 39–117)
ALT SERPL W P-5'-P-CCNC: 13 U/L (ref 1–41)
ANION GAP SERPL CALCULATED.3IONS-SCNC: 11 MMOL/L (ref 5–15)
AST SERPL-CCNC: 18 U/L (ref 1–40)
BASOPHILS # BLD AUTO: 0.05 10*3/MM3 (ref 0–0.2)
BASOPHILS NFR BLD AUTO: 0.6 % (ref 0–1.5)
BH CV ECHO MEAS - AO MAX PG (FULL): 34.5 MMHG
BH CV ECHO MEAS - AO MAX PG: 41.7 MMHG
BH CV ECHO MEAS - AO MEAN PG (FULL): 18 MMHG
BH CV ECHO MEAS - AO MEAN PG: 22 MMHG
BH CV ECHO MEAS - AO V2 MAX: 323 CM/SEC
BH CV ECHO MEAS - AO V2 MEAN: 222 CM/SEC
BH CV ECHO MEAS - AO V2 VTI: 80.3 CM
BH CV ECHO MEAS - AVA(I,A): 1.3 CM^2
BH CV ECHO MEAS - AVA(I,D): 1.3 CM^2
BH CV ECHO MEAS - AVA(V,A): 1.4 CM^2
BH CV ECHO MEAS - AVA(V,D): 1.4 CM^2
BH CV ECHO MEAS - BSA(HAYCOCK): 2.1 M^2
BH CV ECHO MEAS - BSA: 2 M^2
BH CV ECHO MEAS - BZI_BMI: 29.8 KILOGRAMS/M^2
BH CV ECHO MEAS - BZI_METRIC_HEIGHT: 172.7 CM
BH CV ECHO MEAS - BZI_METRIC_WEIGHT: 88.9 KG
BH CV ECHO MEAS - EDV(CUBED): 162.8 ML
BH CV ECHO MEAS - EDV(MOD-SP2): 83 ML
BH CV ECHO MEAS - EDV(MOD-SP4): 52.9 ML
BH CV ECHO MEAS - EDV(TEICH): 145 ML
BH CV ECHO MEAS - EF(CUBED): 67.9 %
BH CV ECHO MEAS - EF(MOD-SP2): 38 %
BH CV ECHO MEAS - EF(MOD-SP4): 64.7 %
BH CV ECHO MEAS - EF(TEICH): 58.9 %
BH CV ECHO MEAS - ESV(CUBED): 52.3 ML
BH CV ECHO MEAS - ESV(MOD-SP2): 51.5 ML
BH CV ECHO MEAS - ESV(MOD-SP4): 18.7 ML
BH CV ECHO MEAS - ESV(TEICH): 59.6 ML
BH CV ECHO MEAS - FS: 31.5 %
BH CV ECHO MEAS - IVS/LVPW: 0.88
BH CV ECHO MEAS - IVSD: 0.95 CM
BH CV ECHO MEAS - LA DIMENSION: 4.7 CM
BH CV ECHO MEAS - LV DIASTOLIC VOL/BSA (35-75): 26.1 ML/M^2
BH CV ECHO MEAS - LV MASS(C)D: 214.5 GRAMS
BH CV ECHO MEAS - LV MASS(C)DI: 105.8 GRAMS/M^2
BH CV ECHO MEAS - LV MAX PG: 7.2 MMHG
BH CV ECHO MEAS - LV MEAN PG: 4 MMHG
BH CV ECHO MEAS - LV SYSTOLIC VOL/BSA (12-30): 9.2 ML/M^2
BH CV ECHO MEAS - LV V1 MAX: 134 CM/SEC
BH CV ECHO MEAS - LV V1 MEAN: 90.6 CM/SEC
BH CV ECHO MEAS - LV V1 VTI: 29.5 CM
BH CV ECHO MEAS - LVIDD: 5.5 CM
BH CV ECHO MEAS - LVIDS: 3.7 CM
BH CV ECHO MEAS - LVLD AP2: 7.2 CM
BH CV ECHO MEAS - LVLD AP4: 6.8 CM
BH CV ECHO MEAS - LVLS AP2: 6.8 CM
BH CV ECHO MEAS - LVLS AP4: 6 CM
BH CV ECHO MEAS - LVOT AREA (M): 3.5 CM^2
BH CV ECHO MEAS - LVOT AREA: 3.5 CM^2
BH CV ECHO MEAS - LVOT DIAM: 2.1 CM
BH CV ECHO MEAS - LVPWD: 1.1 CM
BH CV ECHO MEAS - MR MAX PG: 100 MMHG
BH CV ECHO MEAS - MR MAX VEL: 500 CM/SEC
BH CV ECHO MEAS - MV A MAX VEL: 55.1 CM/SEC
BH CV ECHO MEAS - MV DEC SLOPE: 583 CM/SEC^2
BH CV ECHO MEAS - MV E MAX VEL: 190 CM/SEC
BH CV ECHO MEAS - MV E/A: 3.4
BH CV ECHO MEAS - MV MAX PG: 13.4 MMHG
BH CV ECHO MEAS - MV MEAN PG: 4 MMHG
BH CV ECHO MEAS - MV P1/2T MAX VEL: 187 CM/SEC
BH CV ECHO MEAS - MV P1/2T: 93.9 MSEC
BH CV ECHO MEAS - MV V2 MAX: 183 CM/SEC
BH CV ECHO MEAS - MV V2 MEAN: 90.5 CM/SEC
BH CV ECHO MEAS - MV V2 VTI: 51.2 CM
BH CV ECHO MEAS - MVA P1/2T LCG: 1.2 CM^2
BH CV ECHO MEAS - MVA(P1/2T): 2.3 CM^2
BH CV ECHO MEAS - MVA(VTI): 2 CM^2
BH CV ECHO MEAS - PA MAX PG: 6.8 MMHG
BH CV ECHO MEAS - PA V2 MAX: 130 CM/SEC
BH CV ECHO MEAS - RAP SYSTOLE: 10 MMHG
BH CV ECHO MEAS - RVSP: 44.8 MMHG
BH CV ECHO MEAS - SI(CUBED): 54.5 ML/M^2
BH CV ECHO MEAS - SI(LVOT): 50.4 ML/M^2
BH CV ECHO MEAS - SI(MOD-SP2): 15.5 ML/M^2
BH CV ECHO MEAS - SI(MOD-SP4): 16.9 ML/M^2
BH CV ECHO MEAS - SI(TEICH): 42.1 ML/M^2
BH CV ECHO MEAS - SV(CUBED): 110.5 ML
BH CV ECHO MEAS - SV(LVOT): 102.2 ML
BH CV ECHO MEAS - SV(MOD-SP2): 31.5 ML
BH CV ECHO MEAS - SV(MOD-SP4): 34.2 ML
BH CV ECHO MEAS - SV(TEICH): 85.3 ML
BH CV ECHO MEAS - TR MAX VEL: 295 CM/SEC
BILIRUB SERPL-MCNC: 0.3 MG/DL (ref 0–1.2)
BILIRUB UR QL STRIP: NEGATIVE
BUN SERPL-MCNC: 52 MG/DL (ref 8–23)
BUN/CREAT SERPL: 18.1 (ref 7–25)
CALCIUM SPEC-SCNC: 10.5 MG/DL (ref 8.6–10.5)
CHLORIDE SERPL-SCNC: 100 MMOL/L (ref 98–107)
CLARITY UR: CLEAR
CO2 SERPL-SCNC: 29 MMOL/L (ref 22–29)
COLOR UR: YELLOW
CREAT SERPL-MCNC: 2.87 MG/DL (ref 0.76–1.27)
DEPRECATED RDW RBC AUTO: 45.3 FL (ref 37–54)
EOSINOPHIL # BLD AUTO: 0.48 10*3/MM3 (ref 0–0.4)
EOSINOPHIL NFR BLD AUTO: 6 % (ref 0.3–6.2)
ERYTHROCYTE [DISTWIDTH] IN BLOOD BY AUTOMATED COUNT: 15.1 % (ref 12.3–15.4)
FLUAV RNA RESP QL NAA+PROBE: NOT DETECTED
FLUBV RNA RESP QL NAA+PROBE: NOT DETECTED
GFR SERPL CREATININE-BSD FRML MDRD: 22 ML/MIN/1.73
GLOBULIN UR ELPH-MCNC: 3.1 GM/DL
GLUCOSE BLDC GLUCOMTR-MCNC: 203 MG/DL (ref 70–130)
GLUCOSE BLDC GLUCOMTR-MCNC: 326 MG/DL (ref 70–130)
GLUCOSE BLDC GLUCOMTR-MCNC: 420 MG/DL (ref 70–130)
GLUCOSE SERPL-MCNC: 203 MG/DL (ref 65–99)
GLUCOSE UR STRIP-MCNC: NEGATIVE MG/DL
HCT VFR BLD AUTO: 26.8 % (ref 37.5–51)
HGB BLD-MCNC: 8.2 G/DL (ref 13–17.7)
HGB UR QL STRIP.AUTO: NEGATIVE
HOLD SPECIMEN: NORMAL
IMM GRANULOCYTES # BLD AUTO: 0.03 10*3/MM3 (ref 0–0.05)
IMM GRANULOCYTES NFR BLD AUTO: 0.4 % (ref 0–0.5)
KETONES UR QL STRIP: NEGATIVE
LEUKOCYTE ESTERASE UR QL STRIP.AUTO: NEGATIVE
LV EF 2D ECHO EST: 57 %
LYMPHOCYTES # BLD AUTO: 1.66 10*3/MM3 (ref 0.7–3.1)
LYMPHOCYTES NFR BLD AUTO: 20.7 % (ref 19.6–45.3)
MAGNESIUM SERPL-MCNC: 2.2 MG/DL (ref 1.6–2.4)
MAXIMAL PREDICTED HEART RATE: 150 BPM
MCH RBC QN AUTO: 25.2 PG (ref 26.6–33)
MCHC RBC AUTO-ENTMCNC: 30.6 G/DL (ref 31.5–35.7)
MCV RBC AUTO: 82.5 FL (ref 79–97)
MONOCYTES # BLD AUTO: 1.05 10*3/MM3 (ref 0.1–0.9)
MONOCYTES NFR BLD AUTO: 13.1 % (ref 5–12)
NEUTROPHILS NFR BLD AUTO: 4.76 10*3/MM3 (ref 1.7–7)
NEUTROPHILS NFR BLD AUTO: 59.2 % (ref 42.7–76)
NITRITE UR QL STRIP: NEGATIVE
NRBC BLD AUTO-RTO: 0 /100 WBC (ref 0–0.2)
NT-PROBNP SERPL-MCNC: 1193 PG/ML (ref 0–900)
PH UR STRIP.AUTO: 6 [PH] (ref 5–9)
PLATELET # BLD AUTO: 332 10*3/MM3 (ref 140–450)
PMV BLD AUTO: 10 FL (ref 6–12)
POTASSIUM SERPL-SCNC: 3.7 MMOL/L (ref 3.5–5.2)
PROT SERPL-MCNC: 7.5 G/DL (ref 6–8.5)
PROT UR QL STRIP: NEGATIVE
RBC # BLD AUTO: 3.25 10*6/MM3 (ref 4.14–5.8)
SARS-COV-2 RNA RESP QL NAA+PROBE: NOT DETECTED
SODIUM SERPL-SCNC: 140 MMOL/L (ref 136–145)
SP GR UR STRIP: 1.01 (ref 1–1.03)
STRESS TARGET HR: 128 BPM
TROPONIN T SERPL-MCNC: 0.01 NG/ML (ref 0–0.03)
UROBILINOGEN UR QL STRIP: NORMAL
WBC # BLD AUTO: 8.03 10*3/MM3 (ref 3.4–10.8)
WHOLE BLOOD HOLD SPECIMEN: NORMAL

## 2021-02-19 PROCEDURE — 93306 TTE W/DOPPLER COMPLETE: CPT

## 2021-02-19 PROCEDURE — 83735 ASSAY OF MAGNESIUM: CPT | Performed by: EMERGENCY MEDICINE

## 2021-02-19 PROCEDURE — 93010 ELECTROCARDIOGRAM REPORT: CPT | Performed by: INTERNAL MEDICINE

## 2021-02-19 PROCEDURE — 81003 URINALYSIS AUTO W/O SCOPE: CPT | Performed by: EMERGENCY MEDICINE

## 2021-02-19 PROCEDURE — G0378 HOSPITAL OBSERVATION PER HR: HCPCS

## 2021-02-19 PROCEDURE — 93306 TTE W/DOPPLER COMPLETE: CPT | Performed by: INTERNAL MEDICINE

## 2021-02-19 PROCEDURE — 84484 ASSAY OF TROPONIN QUANT: CPT | Performed by: STUDENT IN AN ORGANIZED HEALTH CARE EDUCATION/TRAINING PROGRAM

## 2021-02-19 PROCEDURE — 84484 ASSAY OF TROPONIN QUANT: CPT | Performed by: EMERGENCY MEDICINE

## 2021-02-19 PROCEDURE — 93005 ELECTROCARDIOGRAM TRACING: CPT | Performed by: EMERGENCY MEDICINE

## 2021-02-19 PROCEDURE — 93005 ELECTROCARDIOGRAM TRACING: CPT

## 2021-02-19 PROCEDURE — 94760 N-INVAS EAR/PLS OXIMETRY 1: CPT

## 2021-02-19 PROCEDURE — 87636 SARSCOV2 & INF A&B AMP PRB: CPT | Performed by: EMERGENCY MEDICINE

## 2021-02-19 PROCEDURE — 99284 EMERGENCY DEPT VISIT MOD MDM: CPT

## 2021-02-19 PROCEDURE — 83880 ASSAY OF NATRIURETIC PEPTIDE: CPT | Performed by: EMERGENCY MEDICINE

## 2021-02-19 PROCEDURE — 82962 GLUCOSE BLOOD TEST: CPT

## 2021-02-19 PROCEDURE — 85025 COMPLETE CBC W/AUTO DIFF WBC: CPT | Performed by: EMERGENCY MEDICINE

## 2021-02-19 PROCEDURE — C9803 HOPD COVID-19 SPEC COLLECT: HCPCS

## 2021-02-19 PROCEDURE — 94640 AIRWAY INHALATION TREATMENT: CPT

## 2021-02-19 PROCEDURE — 80053 COMPREHEN METABOLIC PANEL: CPT | Performed by: EMERGENCY MEDICINE

## 2021-02-19 PROCEDURE — 36415 COLL VENOUS BLD VENIPUNCTURE: CPT | Performed by: STUDENT IN AN ORGANIZED HEALTH CARE EDUCATION/TRAINING PROGRAM

## 2021-02-19 PROCEDURE — 63710000001 INSULIN ASPART PER 5 UNITS: Performed by: STUDENT IN AN ORGANIZED HEALTH CARE EDUCATION/TRAINING PROGRAM

## 2021-02-19 PROCEDURE — 63710000001 INSULIN DETEMIR PER 5 UNITS: Performed by: HOSPITALIST

## 2021-02-19 PROCEDURE — 71045 X-RAY EXAM CHEST 1 VIEW: CPT

## 2021-02-19 PROCEDURE — 99204 OFFICE O/P NEW MOD 45 MIN: CPT | Performed by: INTERNAL MEDICINE

## 2021-02-19 RX ORDER — DILTIAZEM HYDROCHLORIDE 120 MG/1
120 CAPSULE, COATED, EXTENDED RELEASE ORAL DAILY
COMMUNITY

## 2021-02-19 RX ORDER — IPRATROPIUM BROMIDE AND ALBUTEROL SULFATE 2.5; .5 MG/3ML; MG/3ML
3 SOLUTION RESPIRATORY (INHALATION) ONCE
Status: COMPLETED | OUTPATIENT
Start: 2021-02-19 | End: 2021-02-19

## 2021-02-19 RX ORDER — ASPIRIN 325 MG
325 TABLET ORAL ONCE
Status: COMPLETED | OUTPATIENT
Start: 2021-02-19 | End: 2021-02-19

## 2021-02-19 RX ORDER — METOPROLOL SUCCINATE 50 MG/1
100 TABLET, EXTENDED RELEASE ORAL NIGHTLY
Status: DISCONTINUED | OUTPATIENT
Start: 2021-02-19 | End: 2021-02-20 | Stop reason: HOSPADM

## 2021-02-19 RX ORDER — FUROSEMIDE 40 MG/1
40 TABLET ORAL DAILY
Status: DISCONTINUED | OUTPATIENT
Start: 2021-02-19 | End: 2021-02-20 | Stop reason: HOSPADM

## 2021-02-19 RX ORDER — ONDANSETRON 2 MG/ML
4 INJECTION INTRAMUSCULAR; INTRAVENOUS ONCE
Status: DISCONTINUED | OUTPATIENT
Start: 2021-02-19 | End: 2021-02-20 | Stop reason: HOSPADM

## 2021-02-19 RX ORDER — METOLAZONE 5 MG/1
5 TABLET ORAL DAILY
COMMUNITY
End: 2021-02-20 | Stop reason: HOSPADM

## 2021-02-19 RX ORDER — MULTIPLE VITAMINS W/ MINERALS TAB 9MG-400MCG
1 TAB ORAL DAILY
COMMUNITY

## 2021-02-19 RX ORDER — ISOSORBIDE MONONITRATE 30 MG/1
30 TABLET, EXTENDED RELEASE ORAL
Status: DISCONTINUED | OUTPATIENT
Start: 2021-02-19 | End: 2021-02-20 | Stop reason: HOSPADM

## 2021-02-19 RX ORDER — SODIUM CHLORIDE 0.9 % (FLUSH) 0.9 %
10 SYRINGE (ML) INJECTION EVERY 12 HOURS SCHEDULED
Status: DISCONTINUED | OUTPATIENT
Start: 2021-02-19 | End: 2021-02-20 | Stop reason: HOSPADM

## 2021-02-19 RX ORDER — MULTIPLE VITAMINS W/ MINERALS TAB 9MG-400MCG
1 TAB ORAL NIGHTLY
Status: DISCONTINUED | OUTPATIENT
Start: 2021-02-19 | End: 2021-02-20 | Stop reason: HOSPADM

## 2021-02-19 RX ORDER — LOSARTAN POTASSIUM 50 MG/1
50 TABLET ORAL NIGHTLY
Status: DISCONTINUED | OUTPATIENT
Start: 2021-02-19 | End: 2021-02-20 | Stop reason: HOSPADM

## 2021-02-19 RX ORDER — SODIUM CHLORIDE 0.9 % (FLUSH) 0.9 %
10 SYRINGE (ML) INJECTION AS NEEDED
Status: DISCONTINUED | OUTPATIENT
Start: 2021-02-19 | End: 2021-02-20 | Stop reason: HOSPADM

## 2021-02-19 RX ORDER — NICOTINE POLACRILEX 4 MG
15 LOZENGE BUCCAL
Status: DISCONTINUED | OUTPATIENT
Start: 2021-02-19 | End: 2021-02-20 | Stop reason: HOSPADM

## 2021-02-19 RX ORDER — DEXTROSE MONOHYDRATE 25 G/50ML
25 INJECTION, SOLUTION INTRAVENOUS
Status: DISCONTINUED | OUTPATIENT
Start: 2021-02-19 | End: 2021-02-20 | Stop reason: HOSPADM

## 2021-02-19 RX ORDER — LOSARTAN POTASSIUM 50 MG/1
50 TABLET ORAL DAILY
Status: DISCONTINUED | OUTPATIENT
Start: 2021-02-19 | End: 2021-02-19

## 2021-02-19 RX ORDER — MULTIPLE VITAMINS W/ MINERALS TAB 9MG-400MCG
1 TAB ORAL DAILY
Status: DISCONTINUED | OUTPATIENT
Start: 2021-02-19 | End: 2021-02-19

## 2021-02-19 RX ORDER — DIPHENHYDRAMINE HYDROCHLORIDE, ZINC ACETATE 2; .1 G/100G; G/100G
CREAM TOPICAL 3 TIMES DAILY PRN
Status: DISCONTINUED | OUTPATIENT
Start: 2021-02-19 | End: 2021-02-20 | Stop reason: HOSPADM

## 2021-02-19 RX ORDER — DILTIAZEM HYDROCHLORIDE 120 MG/1
120 CAPSULE, COATED, EXTENDED RELEASE ORAL DAILY
Status: DISCONTINUED | OUTPATIENT
Start: 2021-02-19 | End: 2021-02-20 | Stop reason: HOSPADM

## 2021-02-19 RX ORDER — METOPROLOL SUCCINATE 50 MG/1
100 TABLET, EXTENDED RELEASE ORAL DAILY
Status: DISCONTINUED | OUTPATIENT
Start: 2021-02-19 | End: 2021-02-19

## 2021-02-19 RX ORDER — METOLAZONE 5 MG/1
5 TABLET ORAL DAILY
Status: DISCONTINUED | OUTPATIENT
Start: 2021-02-19 | End: 2021-02-19

## 2021-02-19 RX ADMIN — INSULIN DETEMIR 25 UNITS: 100 INJECTION, SOLUTION SUBCUTANEOUS at 20:45

## 2021-02-19 RX ADMIN — TICAGRELOR 90 MG: 90 TABLET ORAL at 20:46

## 2021-02-19 RX ADMIN — METOPROLOL SUCCINATE 100 MG: 50 TABLET, EXTENDED RELEASE ORAL at 20:46

## 2021-02-19 RX ADMIN — ISOSORBIDE MONONITRATE 30 MG: 30 TABLET, EXTENDED RELEASE ORAL at 15:33

## 2021-02-19 RX ADMIN — INSULIN ASPART 5 UNITS: 100 INJECTION, SOLUTION INTRAVENOUS; SUBCUTANEOUS at 18:29

## 2021-02-19 RX ADMIN — SODIUM CHLORIDE, PRESERVATIVE FREE 10 ML: 5 INJECTION INTRAVENOUS at 20:46

## 2021-02-19 RX ADMIN — ASPIRIN 325 MG: 325 TABLET, COATED ORAL at 08:16

## 2021-02-19 RX ADMIN — RIVAROXABAN 20 MG: 10 TABLET, FILM COATED ORAL at 18:29

## 2021-02-19 RX ADMIN — LOSARTAN POTASSIUM 50 MG: 50 TABLET, FILM COATED ORAL at 20:46

## 2021-02-19 RX ADMIN — IPRATROPIUM BROMIDE AND ALBUTEROL SULFATE 3 ML: 2.5; .5 SOLUTION RESPIRATORY (INHALATION) at 07:50

## 2021-02-19 RX ADMIN — Medication 1 TABLET: at 20:45

## 2021-02-19 RX ADMIN — DIPHENHYDRAMINE HYDROCHLORIDE, ZINC ACETATE: 2; .1 CREAM TOPICAL at 18:34

## 2021-02-19 NOTE — H&P
[facility] Medicine Admission      Date of Admission: 2/19/2021      Primary Care Physician: Elvis Jones MD      Chief Complaint: chest pain     HPI:    Mr. Moss is a 70 year old man who presented with chest pain that started this morning. He has a history of CAD s/p stents, a fib on anticoagulation, s/p aortic valve replacement, CHF, HLD, HTN, DM with recently admission to the hospital for acute heart failure. He had chest pain with ambulation this morning when he was going to work. He had some associated shortness of breath. The pain radiated to his neck. He denies any fevers or chills.     In the ED, trop was negative x2. BNP 1193. Creatinine was noted to be 2.87 which is higher than his baseline. He was recently started on metolazone after his recent heart failure exacerbation.       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    Past Surgical History:  has a past surgical history that includes Angioplasty (07/16/2000); Cardiac catheterization  (12/10/2014); Esophagogastroduodenoscopy (10/23/2007); Excision Lesion (07/31/2015); Excision Lesion (07/31/2015); Inguinal Hernia Repair (11/05/2007); Knee surgery (08/06/1985); Aortic valve replacement (12/16/2014); Vasectomy (03/24/1996); Cardiac surgery; Colonoscopy (N/A, 11/20/2017); Cholecystectomy; and Hernia repair.    Family History: family history includes Arthritis in his father and mother; Cancer in his father; Diabetes in his father, mother, and another family member; Hyperlipidemia in his father; Hypertension in his father and mother; Nephrolithiasis in an other family member.     Social History:  reports that he has quit smoking. He has never used smokeless tobacco. He reports current alcohol use. He reports that he does not use drugs.    Allergies:   Allergies   Allergen Reactions   • Evolocumab Other (See Comments)     itching   • Lipitor [Atorvastatin] Myalgia   • Lisinopril Cough       Medications:   Prior to Admission medications    Medication Sig Start Date End Date Taking? Authorizing Provider   Cholecalciferol (VITAMIN D3 PO) Take 4,000 Units by mouth Daily.   Yes Magdy Rosen MD   dilTIAZem CD (CARDIZEM CD) 120 MG 24 hr capsule Take 120 mg by mouth Daily.   Yes Magdy Rosen MD   fenofibrate 160 MG tablet TAKE 1 TABLET BY MOUTH DAILY 2/11/19  Yes Ashvin Peterson MD   furosemide (LASIX) 40 MG tablet Take 1 tablet by mouth Daily for 5 days. 9/10/20 2/19/21 Yes Uzair Steele MD   glipiZIDE (GLUCOTROL) 10 MG tablet Take 1 tablet by mouth 2 (Two) Times a Day. 5/10/18  Yes Ashvin Peterson MD   Icosapent Ethyl (VASCEPA PO) Take 1 g by mouth 2 (two) times a day. 2 capsules bid   Yes Magdy Rosen MD   insulin glargine (LANTUS) 100 UNIT/ML injection Inject 38 Units under the skin Daily.  Patient taking differently: Inject 25 Units under the skin into the appropriate area as directed 2 (Two) Times a Day. 5/16/18  Yes Ashvin Peterson MD   INSULIN LISPRO SC Inject 20  Units under the skin into the appropriate area as directed 3 (Three) Times a Day.   Yes Magdy Rosen MD   losartan (COZAAR) 50 MG tablet TAKE 1 TABLET BY MOUTH DAILY 2/11/19  Yes Ashvin Petersno MD   metOLazone (ZAROXOLYN) 5 MG tablet Take 5 mg by mouth Daily.   Yes Magdy Rosen MD   metoprolol succinate XL (TOPROL-XL) 100 MG 24 hr tablet Take 100 mg by mouth Daily. 8/25/20  Yes Magdy Rosen MD   Multiple Vitamins-Minerals (ZINC PO) Take  by mouth Daily. Unknown dosage   Yes Magdy Rosen MD   multivitamin with minerals (CENTRUM ULTRA MENS PO) Take 1 tablet by mouth Daily.   Yes Magdy Rosen MD   rivaroxaban (Xarelto) 20 MG tablet Take 20 mg by mouth Daily. 11/19/19  Yes Magdy Rosen MD   ticagrelor (BRILINTA) 90 MG tablet tablet Take 90 mg by mouth 2 (Two) Times a Day.   Yes Magdy Rosen MD   Blood Glucose Monitoring Suppl (ONE TOUCH ULTRA 2) w/Device kit USE TO TEST BLOOD SUGAR TWICE DAILY 8/13/18   Ashvin Peterson MD   ONE TOUCH ULTRA TEST test strip USE TO TEST BLOOD SUGAR TWICE DAILY 12/6/18   Ashvin Peterson MD   amiodarone (PACERONE) 200 MG tablet Take 200 mg by mouth Daily. 9/2/20 2/19/21  Magdy Rosen MD   aspirin 81 MG EC tablet Take 81 mg by mouth Daily.  2/19/21  Magdy Rosen MD   fluticasone (FLONASE) 50 MCG/ACT nasal spray 2 sprays into each nostril daily. Med Name: fluticasone 50 mcg/actuation nasal spray,suspension  2/19/21  Magdy Rosen MD   LANTUS 100 UNIT/ML injection ADMINISTER 30 UNITS UNDER THE SKIN DAILY 8/13/18 2/19/21  Ashvin Peterson MD   Multiple Vitamins-Iron (DAILY MULTI-VITAMINS/IRON PO) Take 1 tablet by mouth daily.  2/19/21  Magdy Rosen MD       Review of Systems:  Review of Systems   Constitutional: Negative for chills, diaphoresis and fever.   HENT: Negative for sneezing and sore throat.    Eyes: Negative for pain and discharge.   Respiratory: Positive for shortness of breath.  Negative for cough.    Cardiovascular: Positive for chest pain.   Gastrointestinal: Negative for constipation, diarrhea, nausea and vomiting.   Endocrine: Negative for cold intolerance and heat intolerance.   Genitourinary: Negative for difficulty urinating, dysuria, frequency and urgency.   Musculoskeletal: Negative for arthralgias and myalgias.   Skin: Negative for color change and pallor.   Allergic/Immunologic: Negative for environmental allergies and food allergies.   Neurological: Negative for dizziness, syncope and weakness.   Psychiatric/Behavioral: Negative for confusion and sleep disturbance.      Otherwise complete ROS is negative except as mentioned above.    Physical Exam:   Temp:  [96 °F (35.6 °C)-97.6 °F (36.4 °C)] 97.6 °F (36.4 °C)  Heart Rate:  [60-78] 63  Resp:  [18-22] 18  BP: (114-135)/(45-79) 121/58  Physical Exam  Vitals signs reviewed.   Constitutional:       General: He is not in acute distress.     Appearance: He is well-developed.   HENT:      Head: Normocephalic and atraumatic.      Nose: Nose normal.   Eyes:      Conjunctiva/sclera: Conjunctivae normal.   Neck:      Musculoskeletal: Normal range of motion and neck supple.   Cardiovascular:      Rate and Rhythm: Normal rate and regular rhythm.   Pulmonary:      Effort: Pulmonary effort is normal. No respiratory distress.      Breath sounds: Normal breath sounds. No wheezing or rales.   Musculoskeletal: Normal range of motion.   Skin:     General: Skin is warm and dry.   Neurological:      Mental Status: He is alert and oriented to person, place, and time.   Psychiatric:         Behavior: Behavior normal.           Results Reviewed:  I have personally reviewed current lab, radiology, and data and agree with results.  Lab Results (last 24 hours)     Procedure Component Value Units Date/Time    Troponin [[id number]]  (Normal) Collected: 02/19/21 1200    Specimen: Blood Updated: 02/19/21 1232     Troponin T 0.013 ng/mL     Narrative:       Troponin T Reference Range:  <= 0.03 ng/mL-   Negative for AMI  >0.03 ng/mL-     Abnormal for myocardial necrosis.  Clinicians would have to utilize clinical acumen, EKG, Troponin and serial changes to determine if it is an Acute Myocardial Infarction or myocardial injury due to an underlying chronic condition.       Results may be falsely decreased if patient taking Biotin.      POC Glucose Once [[id number]]  (Abnormal) Collected: 02/19/21 1026    Specimen: Blood Updated: 02/19/21 1221     Glucose 203 mg/dL      Comment: RN NotifiedOperator: [id number] XIMENA Candelaria ID: [id number]       Troponin [[id number]]  (Normal) Collected: 02/19/21 0929    Specimen: Blood Updated: 02/19/21 1009     Troponin T 0.012 ng/mL     Narrative:      Troponin T Reference Range:  <= 0.03 ng/mL-   Negative for AMI  >0.03 ng/mL-     Abnormal for myocardial necrosis.  Clinicians would have to utilize clinical acumen, EKG, Troponin and serial changes to determine if it is an Acute Myocardial Infarction or myocardial injury due to an underlying chronic condition.       Results may be falsely decreased if patient taking Biotin.      Extra Tubes [[id number]] Collected: 02/19/21 0732    Specimen: Blood, Venous Line Updated: 02/19/21 0845    Narrative:      The following orders were created for panel order Extra Tubes.  Procedure                               Abnormality         Status                     ---------                               -----------         ------                     Light Blue Top[[id number]]                                   Final result               Gold Top - SST[[id number]]                                   Final result                 Please view results for these tests on the individual orders.    Light Blue Top [[id number]] Collected: 02/19/21 0732    Specimen: Blood Updated: 02/19/21 0845     Extra Tube hold for add-on     Comment: Auto resulted       Gold Top - SST [[id number]] Collected: 02/19/21 0732     Specimen: Blood Updated: 02/19/21 0845     Extra Tube Hold for add-ons.     Comment: Auto resulted.       COVID-19 and FLU A/B PCR - Swab, Nasopharynx [[id number]]  (Normal) Collected: 02/19/21 0746    Specimen: Swab from Nasopharynx Updated: 02/19/21 0812     COVID19 Not Detected     Influenza A PCR Not Detected     Influenza B PCR Not Detected    Narrative:      Fact sheet for providers: https://www.fda.gov/media/589783/download    Fact sheet for patients: https://www.fda.gov/media/482381/download    Test performed by PCR.    Comprehensive Metabolic Panel [[id number]]  (Abnormal) Collected: 02/19/21 0727    Specimen: Blood from Arm, Right Updated: 02/19/21 0800     Glucose 203 mg/dL      BUN 52 mg/dL      Creatinine 2.87 mg/dL      Sodium 140 mmol/L      Potassium 3.7 mmol/L      Chloride 100 mmol/L      CO2 29.0 mmol/L      Calcium 10.5 mg/dL      Total Protein 7.5 g/dL      Albumin 4.40 g/dL      ALT (SGPT) 13 U/L      AST (SGOT) 18 U/L      Alkaline Phosphatase 47 U/L      Total Bilirubin 0.3 mg/dL      eGFR Non African Amer 22 mL/min/1.73      Globulin 3.1 gm/dL      A/G Ratio 1.4 g/dL      BUN/Creatinine Ratio 18.1     Anion Gap 11.0 mmol/L     Narrative:      GFR Normal >60  Chronic Kidney Disease <60  Kidney Failure <15      Troponin [[id number]]  (Normal) Collected: 02/19/21 0727    Specimen: Blood from Arm, Right Updated: 02/19/21 0800     Troponin T 0.015 ng/mL     Narrative:      Troponin T Reference Range:  <= 0.03 ng/mL-   Negative for AMI  >0.03 ng/mL-     Abnormal for myocardial necrosis.  Clinicians would have to utilize clinical acumen, EKG, Troponin and serial changes to determine if it is an Acute Myocardial Infarction or myocardial injury due to an underlying chronic condition.       Results may be falsely decreased if patient taking Biotin.      Magnesium [[id number]]  (Normal) Collected: 02/19/21 0727    Specimen: Blood from Arm, Right Updated: 02/19/21 0800     Magnesium 2.2 mg/dL     BNP  [[id number]]  (Abnormal) Collected: 02/19/21 0727    Specimen: Blood from Arm, Right Updated: 02/19/21 0758     proBNP 1,193.0 pg/mL     Narrative:      Among patients with dyspnea, NT-proBNP is highly sensitive for the detection of acute congestive heart failure. In addition NT-proBNP of <300 pg/ml effectively rules out acute congestive heart failure with 99% negative predictive value.    Results may be falsely decreased if patient taking Biotin.      Urinalysis With Microscopic If Indicated (No Culture) - Urine, Clean Catch [[id number]]  (Normal) Collected: 02/19/21 0746    Specimen: Urine, Clean Catch Updated: 02/19/21 0754     Color, UA Yellow     Appearance, UA Clear     pH, UA 6.0     Specific Gravity, UA 1.008     Glucose, UA Negative     Ketones, UA Negative     Bilirubin, UA Negative     Blood, UA Negative     Protein, UA Negative     Leuk Esterase, UA Negative     Nitrite, UA Negative     Urobilinogen, UA 0.2 E.U./dL    Narrative:      Urine microscopic not indicated.    CBC & Differential [[id number]]  (Abnormal) Collected: 02/19/21 0727    Specimen: Blood Updated: 02/19/21 0740    Narrative:      The following orders were created for panel order CBC & Differential.  Procedure                               Abnormality         Status                     ---------                               -----------         ------                     CBC Auto Differential[[id number]]        Abnormal            Final result                 Please view results for these tests on the individual orders.    CBC Auto Differential [[id number]]  (Abnormal) Collected: 02/19/21 0727    Specimen: Blood Updated: 02/19/21 0740     WBC 8.03 10*3/mm3      RBC 3.25 10*6/mm3      Hemoglobin 8.2 g/dL      Hematocrit 26.8 %      MCV 82.5 fL      MCH 25.2 pg      MCHC 30.6 g/dL      RDW 15.1 %      RDW-SD 45.3 fl      MPV 10.0 fL      Platelets 332 10*3/mm3      Neutrophil % 59.2 %      Lymphocyte % 20.7 %      Monocyte % 13.1 %       Eosinophil % 6.0 %      Basophil % 0.6 %      Immature Grans % 0.4 %      Neutrophils, Absolute 4.76 10*3/mm3      Lymphocytes, Absolute 1.66 10*3/mm3      Monocytes, Absolute 1.05 10*3/mm3      Eosinophils, Absolute 0.48 10*3/mm3      Basophils, Absolute 0.05 10*3/mm3      Immature Grans, Absolute 0.03 10*3/mm3      nRBC 0.0 /100 WBC         Imaging Results (Last 24 Hours)     Procedure Component Value Units Date/Time    XR Chest 1 View [[id number]] Collected: 02/19/21 0727     Updated: 02/19/21 0747    Narrative:      Chest x-ray single view.           CLINICAL INDICATION: Chest pain.        COMPARISON: Chest September 10, 2020     FINDINGS: Cardiac silhouette is normal in size. Pulmonary  vascularity is unremarkable.   Midline sternal sutures from prior cardiac surgery. Pacemaker  leads right atrium and right ventricle.    Lung fields are clear.  No focal infiltrate or consolidation.  No pleural effusion.  No  pneumothorax.      Impression:      No evidence of active disease.    Electronically signed by:  Giacomo Tarango MD  2/19/2021 7:45 AM CST  Workstation: [id number]            Assessment:    Active Hospital Problems    Diagnosis   • Chest pain, rule out acute myocardial infarction   • Stage 3 chronic kidney disease (CMS/HCC)   • Atrial fibrillation (CMS/HCC)     Atrial fibrillation - RSR post MAZE     • Type 2 diabetes mellitus with microalbuminuria (CMS/HCC)   • Bicuspid aortic valve     Bicuspid aortic valve - AVR replacement 12/16/14     • Coronary atherosclerosis of native coronary artery     Coronary atherosclerosis of native coronary artery - S/P 3.0 x 23mm Xience V stent to mid RCA 12/11 (NSTEMI     • Benign non-nodular prostatic hyperplasia with lower urinary tract symptoms     Post TUR 09/09/2016     • Hyperlipidemia             Plan:    1. Chest pain  -given his history, cardiology was consulted. Dr. Ibarra saw the patient. He recommended overnight observation, starting IMDUR and follow up with his  cardiologist in [place].  -trop trended and negative  -MICHELLE  -continue Brilinta   -continue Toprol, Losartan  -obtain echo    2. A Fib  -cardiac monitoring  -continue BB, CCB  -Xarelto    3. DM  -levemir 20mg daily  -SSI     4. CARO on CKD  -will stop metolazone and monitor renal function. Could be secondary to the addition of new diuretic.      DVT prop: Xarelto  Dispo: home tomorrow if renal function is better and cardiology has signed off        This document has been electronically signed by Jacquelyn Garcia MD on February 19, 2021 14:33 CST

## 2021-02-19 NOTE — H&P
"      [facility] Medicine Admission      Date of Admission: 2/19/2021      Primary Care Physician: Elvis Jones MD      Chief Complaint: Chest pain    HPI:    Patient is a 70 year old male w/PMH coronary artery disease, CHF, a-fib with pacemaker, hypertension, hyperlipidemia, aortic valve replacement and CKD stage 3 presenting to the ED this morning 02/19/21 with complaints of chest pain.     He states that he had chest heaviness that felt like \"an elephant was standing on my chest\" that started around 6:30 this morning upon awakening and gradually became worse during the morning. He states that as he was walking into work at [employer] this morning his chest became heavy and stated at that time he had a sharp pain running down his left arm. At that time he drove to the hospital as he concerned it was a heart attack. He states that the pain continued until he was able to rest at the hospital for a while and was given aspirin and duoneb. He states that he has not had an episode of pain like this in the past. He does not endorse significant aggravating or relieving factors. He states the pain does not get worse upon inspiration or expiration.     He was recently placed on metolazone for his CHF.     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    Past Surgical History:  has a past surgical history that includes Angioplasty (07/16/2000); Cardiac catheterization (12/10/2014); Esophagogastroduodenoscopy (10/23/2007); Excision Lesion (07/31/2015); Excision Lesion (07/31/2015); Inguinal Hernia Repair (11/05/2007); Knee surgery (08/06/1985); Aortic valve replacement (12/16/2014); Vasectomy (03/24/1996); Cardiac surgery; and Colonoscopy (N/A, 11/20/2017).    Family History: family history includes Arthritis in his father and mother; Cancer in his father; Diabetes in his father, mother, and another family member; Hyperlipidemia in his father; Hypertension in his father and mother; Nephrolithiasis in an other family member.     Social History:  reports that he has quit smoking. He has never used smokeless tobacco. He reports current alcohol use. He reports that he does not use drugs.    Allergies:   Allergies   Allergen Reactions   • Evolocumab Other (See Comments)     itching   • Lipitor [Atorvastatin] Myalgia   • Lisinopril Cough       Medications:   Prior to Admission medications    Medication Sig Start Date End Date Taking? Authorizing Provider   Cholecalciferol (VITAMIN D3 PO) Take 4,000 Units by mouth Daily.   Yes ProviderMagdy MD   dilTIAZem CD (CARDIZEM CD) 120 MG 24 hr capsule Take 120 mg by mouth Daily.   Yes Magdy Rosen MD   fenofibrate 160 MG tablet TAKE 1 TABLET BY MOUTH DAILY 2/11/19  Yes Ashvin Peterson MD   furosemide (LASIX) 40 MG tablet Take 1 tablet by mouth Daily for 5 days. 9/10/20 2/19/21 Yes Uzair Steele MD   glipiZIDE (GLUCOTROL) 10 MG tablet Take 1 tablet by mouth 2 (Two) Times a Day. 5/10/18  Yes Ashvin Peterson MD   Icosapent Ethyl (VASCEPA PO) Take 1 g by mouth 2 (two) times a day. 2 capsules bid   Yes  Magdy Rosen MD   insulin glargine (LANTUS) 100 UNIT/ML injection Inject 38 Units under the skin Daily.  Patient taking differently: Inject 25 Units under the skin into the appropriate area as directed 2 (Two) Times a Day. 5/16/18  Yes Ashvin Peterson MD   INSULIN LISPRO SC Inject 20 Units under the skin into the appropriate area as directed 3 (Three) Times a Day.   Yes Magdy Rosen MD   losartan (COZAAR) 50 MG tablet TAKE 1 TABLET BY MOUTH DAILY 2/11/19  Yes Ashvin Peterson MD   metOLazone (ZAROXOLYN) 5 MG tablet Take 5 mg by mouth Daily.   Yes Magdy Rosen MD   metoprolol succinate XL (TOPROL-XL) 100 MG 24 hr tablet Take 100 mg by mouth Daily. 8/25/20  Yes Magdy Rosen MD   Multiple Vitamins-Minerals (ZINC PO) Take  by mouth Daily. Unknown dosage   Yes Magdy Rosen MD   multivitamin with minerals (CENTRUM ULTRA MENS PO) Take 1 tablet by mouth Daily.   Yes Magdy Rosen MD   rivaroxaban (Xarelto) 20 MG tablet Take 20 mg by mouth Daily. 11/19/19  Yes Magdy Rosen MD   ticagrelor (BRILINTA) 90 MG tablet tablet Take 90 mg by mouth 2 (Two) Times a Day.   Yes Magdy Rosen MD   amiodarone (PACERONE) 200 MG tablet Take 200 mg by mouth Daily. 9/2/20 9/24/22  Magdy Rosen MD   aspirin 81 MG EC tablet Take 81 mg by mouth Daily.    Magdy Rosen MD   Blood Glucose Monitoring Suppl (ONE TOUCH ULTRA 2) w/Device kit USE TO TEST BLOOD SUGAR TWICE DAILY 8/13/18   Ashvin Peterson MD   fluticasone (FLONASE) 50 MCG/ACT nasal spray 2 sprays into each nostril daily. Med Name: fluticasone 50 mcg/actuation nasal spray,suspension    Magdy Rosen MD   LANTUS 100 UNIT/ML injection ADMINISTER 30 UNITS UNDER THE SKIN DAILY 8/13/18   Ashvin Peterson MD   Multiple Vitamins-Iron (DAILY MULTI-VITAMINS/IRON PO) Take 1 tablet by mouth daily.    Magdy Rosen MD   ONE TOUCH ULTRA TEST test strip USE TO TEST BLOOD SUGAR TWICE DAILY 12/6/18    Ashvin Peterson MD       Review of Systems:  Review of Systems   Constitutional: Negative.    HENT: Negative.    Eyes: Negative.    Respiratory: Positive for cough.    Cardiovascular: Positive for chest pain and leg swelling.   Gastrointestinal: Negative.    Musculoskeletal: Negative.    Skin: Negative.    Allergic/Immunologic: Negative.    Neurological: Positive for dizziness.   Psychiatric/Behavioral: Positive for sleep disturbance.      Otherwise complete ROS is negative except as mentioned above.    Physical Exam:   Temp:  [96.4 °F (35.8 °C)] 96.4 °F (35.8 °C)  Heart Rate:  [60-76] 62  Resp:  [20-22] 20  BP: (114-135)/(45-79) 125/57  Physical Exam  Constitutional:       General: He is not in acute distress.     Appearance: Normal appearance.   HENT:      Head: Normocephalic and atraumatic.   Neck:      Musculoskeletal: Normal range of motion.   Cardiovascular:      Rate and Rhythm: Normal rate. Rhythm irregular.      Pulses: Normal pulses.      Heart sounds: Normal heart sounds.   Pulmonary:      Effort: Pulmonary effort is normal.      Breath sounds: Normal breath sounds.   Abdominal:      General: Abdomen is flat. Bowel sounds are normal.      Palpations: Abdomen is soft.   Musculoskeletal: Normal range of motion.   Skin:     General: Skin is warm and dry.   Neurological:      Mental Status: He is alert and oriented to person, place, and time.   Psychiatric:         Mood and Affect: Mood normal.         Behavior: Behavior normal.         Thought Content: Thought content normal.         Judgment: Judgment normal.           Results Reviewed:  I have personally reviewed current lab, radiology, and data and agree with results.  Lab Results (last 24 hours)     Procedure Component Value Units Date/Time    Extra Tubes [[id number]] Collected: 02/19/21 0732    Specimen: Blood, Venous Line Updated: 02/19/21 0819    Narrative:      The following orders were created for panel order Extra Tubes.  Procedure                                Abnormality         Status                     ---------                               -----------         ------                     Light Blue Top[[id number]]                                   Final result               Gold Top - SST[[id number]]                                   Final result                 Please view results for these tests on the individual orders.    Light Blue Top [[id number]] Collected: 02/19/21 0732    Specimen: Blood Updated: 02/19/21 0845     Extra Tube hold for add-on     Comment: Auto resulted       Gold Top - SST [[id number]] Collected: 02/19/21 0732    Specimen: Blood Updated: 02/19/21 0845     Extra Tube Hold for add-ons.     Comment: Auto resulted.       COVID-19 and FLU A/B PCR - Swab, Nasopharynx [[id number]]  (Normal) Collected: 02/19/21 0746    Specimen: Swab from Nasopharynx Updated: 02/19/21 0812     COVID19 Not Detected     Influenza A PCR Not Detected     Influenza B PCR Not Detected    Narrative:      Fact sheet for providers: https://www.fda.gov/media/521342/download    Fact sheet for patients: https://www.fda.gov/media/182822/download    Test performed by PCR.    Comprehensive Metabolic Panel [[id number]]  (Abnormal) Collected: 02/19/21 0727    Specimen: Blood from Arm, Right Updated: 02/19/21 0800     Glucose 203 mg/dL      BUN 52 mg/dL      Creatinine 2.87 mg/dL      Sodium 140 mmol/L      Potassium 3.7 mmol/L      Chloride 100 mmol/L      CO2 29.0 mmol/L      Calcium 10.5 mg/dL      Total Protein 7.5 g/dL      Albumin 4.40 g/dL      ALT (SGPT) 13 U/L      AST (SGOT) 18 U/L      Alkaline Phosphatase 47 U/L      Total Bilirubin 0.3 mg/dL      eGFR Non African Amer 22 mL/min/1.73      Globulin 3.1 gm/dL      A/G Ratio 1.4 g/dL      BUN/Creatinine Ratio 18.1     Anion Gap 11.0 mmol/L     Narrative:      GFR Normal >60  Chronic Kidney Disease <60  Kidney Failure <15      Troponin [[id number]]  (Normal) Collected: 02/19/21 0727    Specimen: Blood from Arm, Right  Updated: 02/19/21 0800     Troponin T 0.015 ng/mL     Narrative:      Troponin T Reference Range:  <= 0.03 ng/mL-   Negative for AMI  >0.03 ng/mL-     Abnormal for myocardial necrosis.  Clinicians would have to utilize clinical acumen, EKG, Troponin and serial changes to determine if it is an Acute Myocardial Infarction or myocardial injury due to an underlying chronic condition.       Results may be falsely decreased if patient taking Biotin.      Magnesium [[id number]]  (Normal) Collected: 02/19/21 0727    Specimen: Blood from Arm, Right Updated: 02/19/21 0800     Magnesium 2.2 mg/dL     BNP [[id number]]  (Abnormal) Collected: 02/19/21 0727    Specimen: Blood from Arm, Right Updated: 02/19/21 0758     proBNP 1,193.0 pg/mL     Narrative:      Among patients with dyspnea, NT-proBNP is highly sensitive for the detection of acute congestive heart failure. In addition NT-proBNP of <300 pg/ml effectively rules out acute congestive heart failure with 99% negative predictive value.    Results may be falsely decreased if patient taking Biotin.      Urinalysis With Microscopic If Indicated (No Culture) - Urine, Clean Catch [[id number]]  (Normal) Collected: 02/19/21 0746    Specimen: Urine, Clean Catch Updated: 02/19/21 0754     Color, UA Yellow     Appearance, UA Clear     pH, UA 6.0     Specific Gravity, UA 1.008     Glucose, UA Negative     Ketones, UA Negative     Bilirubin, UA Negative     Blood, UA Negative     Protein, UA Negative     Leuk Esterase, UA Negative     Nitrite, UA Negative     Urobilinogen, UA 0.2 E.U./dL    Narrative:      Urine microscopic not indicated.    CBC & Differential [[id number]]  (Abnormal) Collected: 02/19/21 0727    Specimen: Blood Updated: 02/19/21 0740    Narrative:      The following orders were created for panel order CBC & Differential.  Procedure                               Abnormality         Status                     ---------                               -----------         ------                      CBC Auto Differential[[id number]]        Abnormal            Final result                 Please view results for these tests on the individual orders.    CBC Auto Differential [[id number]]  (Abnormal) Collected: 02/19/21 0727    Specimen: Blood Updated: 02/19/21 0740     WBC 8.03 10*3/mm3      RBC 3.25 10*6/mm3      Hemoglobin 8.2 g/dL      Hematocrit 26.8 %      MCV 82.5 fL      MCH 25.2 pg      MCHC 30.6 g/dL      RDW 15.1 %      RDW-SD 45.3 fl      MPV 10.0 fL      Platelets 332 10*3/mm3      Neutrophil % 59.2 %      Lymphocyte % 20.7 %      Monocyte % 13.1 %      Eosinophil % 6.0 %      Basophil % 0.6 %      Immature Grans % 0.4 %      Neutrophils, Absolute 4.76 10*3/mm3      Lymphocytes, Absolute 1.66 10*3/mm3      Monocytes, Absolute 1.05 10*3/mm3      Eosinophils, Absolute 0.48 10*3/mm3      Basophils, Absolute 0.05 10*3/mm3      Immature Grans, Absolute 0.03 10*3/mm3      nRBC 0.0 /100 WBC         Imaging Results (Last 24 Hours)     Procedure Component Value Units Date/Time    XR Chest 1 View [[id number]] Collected: 02/19/21 0727     Updated: 02/19/21 0747    Narrative:      Chest x-ray single view.           CLINICAL INDICATION: Chest pain.        COMPARISON: Chest September 10, 2020     FINDINGS: Cardiac silhouette is normal in size. Pulmonary  vascularity is unremarkable.   Midline sternal sutures from prior cardiac surgery. Pacemaker  leads right atrium and right ventricle.    Lung fields are clear.  No focal infiltrate or consolidation.  No pleural effusion.  No  pneumothorax.      Impression:      No evidence of active disease.    Electronically signed by:  Giacomo Tarango MD  2/19/2021 7:45 AM CST  Workstation: [id number]            Assessment:    Active Hospital Problems    Diagnosis   • Chest pain, rule out acute myocardial infarction     H/P suggests that this is likely cardiac in nature. However, this does not describe classic symptoms associated with an acute MI. Cardiac enzymes  and EKG further confirm that this is most likely not an acute MI. Possible that this episode is due to an acute CHF exacerbation or unstable angina.         Plan:    Admit patient to hospitalist floor for monitoring. Recommend echocardiogram to determine state of CHF as well as a stress test to rule out unstable angina. Renal function appears to be significantly impaired and should be closely monitored.     I discussed the patients findings and my recommendations with: ***      This document has been electronically signed by Miles Velázquez, Medical Student on February 19, 2021 09:06 CST

## 2021-02-19 NOTE — ED PROVIDER NOTES
Subjective   70 years old male with history of coronary artery disease status post stenting, congestive heart failure, atrial fibrillation on Xarelto/pacemaker placement, aortic valve replacement, hypertension, hyperlipidemia, diabetes mellitus presented in the ER with chief complaint of moderate substernal chest tightness and pressure sudden onset this morning almost an hour ago when he was getting ready to go work with associated shortness of breath.  Patient reports having shortness of breath at his baseline which is not much worse than usual but his chest tightness pressure is radiating to left arm without any significant aggravating or relieving factors.  Patient denies any fever chills or sick contact.  Patient was recently admitted at [facility] with congestive heart failure and has been started on metolazone recently.      History provided by:  Patient      Review of Systems   Constitutional: Negative for chills.   HENT: Negative for congestion, sinus pressure, sinus pain, sneezing and sore throat.    Respiratory: Positive for cough, chest tightness and shortness of breath.    Cardiovascular: Positive for chest pain and leg swelling.   Gastrointestinal: Negative for abdominal pain, nausea and vomiting.   Genitourinary: Negative for flank pain.   Musculoskeletal: Negative for joint swelling.   Neurological: Negative for syncope and headaches.   Psychiatric/Behavioral: Negative for agitation.       Past Medical History:   Diagnosis Date   • Actinic keratosis    • Acute bronchitis    • Acute upper respiratory infection     Acute upper respiratory infection, unspecified   • Aortic valve stenosis     Aortic valve stenosis - post AVR   • Asthma    • Atrial fibrillation (CMS/HCC)     Atrial fibrillation - RSR post MAZE   • Bicuspid aortic valve     Bicuspid aortic valve - AVR replacement 12/16/14   • Coronary arteriosclerosis    • Coronary atherosclerosis of native coronary artery     Coronary atherosclerosis  of native coronary artery - S/P 3.0 x 23mm Xience V stent to mid RCA 12/11 (NSTEMI   • Cortical senile cataract    • Crushing injury     Crushing injury - left 4th finger   • Diabetes mellitus (CMS/HCC)     Diabetes mellitus - no retinopathy on today's exam   • 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   • History of echocardiogram 12/14/2011    Echocardiogram W/O color flow 08339 (1) - Normal LV systolic function with diastolic dysfunction. Biscupid aortic valve w/mild to moderate aortic stenosis and mild aortic insufficiency. Mild left atrial enlargement.   • Hyperlipidemia     Hyperlipidemia - cannot tolerate higher dose than 20 mg lipitor   • Hypermetropia    • Inguinal hernia, recurrent     Inguinal hernia - recurrent   • Microalbuminuria    • Need for immunization against influenza     Needs influenza immunization   • Postoperative pain     Postoperative pain - 12/16/14 AVR Medtronic concave Aortic 25 mm Mosaic [id number] model [id number] serial [id number] MAZE Resolved   • Postoperative visit     Postoperative visit - 12/16/14 AVR Medtronic concave Aortic 25 mm Mosaic [id number] model [id number] serial [id number] MAZE   • Scalding pain on urination    • Type 2 diabetes mellitus (CMS/HCC)        Allergies   Allergen Reactions   • Evolocumab Other (See Comments)     itching   • Lipitor [Atorvastatin] Myalgia   • Lisinopril Cough       Past Surgical History:   Procedure Laterality Date   • ANGIOPLASTY  07/16/2000    Angioplasty, coronary (1) - wide open patency to anterior  descending artery at the inflation sites. No intimal luminal irregularities, tears, or residual stenosis found   • AORTIC VALVE REPAIR/REPLACEMENT  12/16/2014    Replacement of aortic valve (1) - 12/16/14 AVR Medtronic concave Aortic 25 mm Mosaic [id number] model [id number] serial [id number] MAZE   • CARDIAC CATHETERIZATION  12/10/2014    Cardiac cath 92233 (3) - Patent right coronary artery stent. MInimal nonobstructive disease in the LAD and left circumflex.   • CARDIAC SURGERY      valve   • COLONOSCOPY N/A 11/20/2017    Procedure: COLONOSCOPY;  Surgeon: Rony Pena MD;  Location: [facility] ENDOSCOPY;  Service:    • ENDOSCOPY  10/23/2007    Colon endoscopy 55984 (1) - remarkably poor [rep potentially obscures any small polyps that might be present. Colonic mucosa appeared entirely NRL. There were no masses or polyps found   • EXCISION LESION  07/31/2015    Destruction of Premalignant Lesion (1st) 30630 (1) - CYNTHIA POWELL (FP)   • EXCISION LESION  07/31/2015    Destruction of Premalignant Lesions (2-14) 82455 (1) - CYNTHIA POWELL (FP)   • INGUINAL HERNIA REPAIR  11/05/2007    Inguinal hernia, repair (1) - Sutured right inguinal hernia repair. Direct inguinal hernia, recurring in previous mesh repair   • KNEE SURGERY  08/06/1985    Knee Surgery (1) - Debridement, arthroscopy, abrasive chondoplasty. Degenerative chondromalacia, medial femoral condyle, left knee   • VASECTOMY  03/24/1996    Vasectomy - ligate (1) - elective       Family History   Problem Relation Age of Onset   • Diabetes Other    • Nephrolithiasis Other    • Arthritis Mother    • Diabetes Mother    • Hypertension Mother    • Arthritis Father    • Cancer Father         skin   • Diabetes Father    • Hyperlipidemia Father    • Hypertension Father    • Coronary artery disease Neg Hx        Social History     Socioeconomic History   • Marital status:      Spouse name: Not on file   • Number of children: Not on file   • Years of education: Not on file   •  Highest education level: Not on file   Tobacco Use   • Smoking status: Former Smoker   • Smokeless tobacco: Never Used   Substance and Sexual Activity   • Alcohol use: Yes     Comment: 1 beer daily   • Drug use: No           Objective   Physical Exam  Vitals signs and nursing note reviewed.   Constitutional:       Appearance: He is well-developed.   HENT:      Head: Normocephalic and atraumatic.   Eyes:      Extraocular Movements: Extraocular movements intact.      Pupils: Pupils are equal, round, and reactive to light.   Neck:      Musculoskeletal: Normal range of motion and neck supple.   Cardiovascular:      Rate and Rhythm: Normal rate and regular rhythm.      Heart sounds: Normal heart sounds.   Pulmonary:      Effort: Pulmonary effort is normal. No tachypnea or accessory muscle usage.      Breath sounds: Rhonchi present.   Abdominal:      General: Bowel sounds are normal.      Palpations: Abdomen is soft.   Musculoskeletal:      Right lower leg: Edema present.      Left lower leg: Edema present.   Skin:     Capillary Refill: Capillary refill takes less than 2 seconds.   Neurological:      General: No focal deficit present.      Mental Status: He is alert and oriented to person, place, and time.   Psychiatric:         Mood and Affect: Mood normal.         ECG 12 Lead      Date/Time: 2/19/2021 7:27 AM  Performed by: Guillaume Maurer MD  Authorized by: Guillaume Maurer MD   Interpreted by physician  Rhythm: paced  Rate: normal  BPM: 68  QRS axis: left  Conduction: right bundle branch block  Clinical impression: abnormal ECG                 ED Course                                           MDM  Number of Diagnoses or Management Options  Chest pain, rule out acute myocardial infarction:   Diagnosis management comments: 70 years old is evaluated for substernal chest tightness and pressure with minimal shortness of breath.  EKG showed paced rhythm and has negative initial troponin.  Patient is on Xarelto/Brilinta.  X-ray  is negative for any acute cardiopulmonary findings but patient does have lower extremity increasing swelling, probably going to her CHF exacerbation.  Patient has taken 80 mg Lasix and metolazone just prior to arrival, will not load him up with another dose of IV Lasix.  Given symptomatic treatment for pain and chest tightness, on reevaluation feeling better.  Discussed with Dr. Garcia and patient is admitted for observation.       Amount and/or Complexity of Data Reviewed  Clinical lab tests: ordered and reviewed  Tests in the radiology section of CPT®: ordered and reviewed  Discuss the patient with other providers: yes      Labs Reviewed   COMPREHENSIVE METABOLIC PANEL - Abnormal; Notable for the following components:       Result Value    Glucose 203 (*)     BUN 52 (*)     Creatinine 2.87 (*)     eGFR Non  Amer 22 (*)     All other components within normal limits    Narrative:     GFR Normal >60  Chronic Kidney Disease <60  Kidney Failure <15     BNP (IN-HOUSE) - Abnormal; Notable for the following components:    proBNP 1,193.0 (*)     All other components within normal limits    Narrative:     Among patients with dyspnea, NT-proBNP is highly sensitive for the detection of acute congestive heart failure. In addition NT-proBNP of <300 pg/ml effectively rules out acute congestive heart failure with 99% negative predictive value.    Results may be falsely decreased if patient taking Biotin.     CBC WITH AUTO DIFFERENTIAL - Abnormal; Notable for the following components:    RBC 3.25 (*)     Hemoglobin 8.2 (*)     Hematocrit 26.8 (*)     MCH 25.2 (*)     MCHC 30.6 (*)     Monocyte % 13.1 (*)     Monocytes, Absolute 1.05 (*)     Eosinophils, Absolute 0.48 (*)     All other components within normal limits   COVID-19 AND FLU A/B, NP SWAB IN TRANSPORT MEDIA 8-12 HR TAT - Normal    Narrative:     Fact sheet for providers: https://www.fda.gov/media/650318/download    Fact sheet for patients:  https://www.fda.gov/media/069208/download    Test performed by PCR.   URINALYSIS W/ MICROSCOPIC IF INDICATED (NO CULTURE) - Normal    Narrative:     Urine microscopic not indicated.   TROPONIN (IN-HOUSE) - Normal    Narrative:     Troponin T Reference Range:  <= 0.03 ng/mL-   Negative for AMI  >0.03 ng/mL-     Abnormal for myocardial necrosis.  Clinicians would have to utilize clinical acumen, EKG, Troponin and serial changes to determine if it is an Acute Myocardial Infarction or myocardial injury due to an underlying chronic condition.       Results may be falsely decreased if patient taking Biotin.     MAGNESIUM - Normal   TROPONIN (IN-HOUSE)   CBC AND DIFFERENTIAL    Narrative:     The following orders were created for panel order CBC & Differential.  Procedure                               Abnormality         Status                     ---------                               -----------         ------                     CBC Auto Differential[[id number]]        Abnormal            Final result                 Please view results for these tests on the individual orders.   EXTRA TUBES    Narrative:     The following orders were created for panel order Extra Tubes.  Procedure                               Abnormality         Status                     ---------                               -----------         ------                     Light Blue Top[[id number]]                                   In process                 Gold Top - SST[[id number]]                                   In process                   Please view results for these tests on the individual orders.   LIGHT BLUE TOP   GOLD TOP - SST       Xr Chest 1 View    Result Date: 2/19/2021  Narrative: Chest x-ray single view. CLINICAL INDICATION: Chest pain. COMPARISON: Chest September 10, 2020 FINDINGS: Cardiac silhouette is normal in size. Pulmonary vascularity is unremarkable. Midline sternal sutures from prior cardiac surgery. Pacemaker leads right  atrium and right ventricle. Lung fields are clear. No focal infiltrate or consolidation.  No pleural effusion.  No pneumothorax.     Impression: No evidence of active disease. Electronically signed by:  Giacomo Tarango MD  2/19/2021 7:45 AM CST Workstation: [id number]        Final diagnoses:   Chest pain, rule out acute myocardial infarction            Guillaume Maurer MD  02/19/21 0843

## 2021-02-19 NOTE — PLAN OF CARE
Goal Outcome Evaluation:  Plan of Care Reviewed With: patient  Progress: improving  Outcome Summary: Pt new admission today c/o chest pain. Pt currently denies any chest pain or shortness of breath. Echo results pending. VSS.

## 2021-02-19 NOTE — CONSULTS
Cardiology at [facility]  Cardiovascular Consultation Note      Kiran Moss  327/1  [id number]  1950    DATE OF ADMISSION: 2/19/2021  DATE OF CONSULTATION:  2/19/2021    Elvis Jones MD  Treatment Team:   Attending Provider: Jacquelyn Garcia MD  Consulting Physician: Jacquelyn Garcia MD  Consulting Physician: Lino Ibarra MD  Admitting Provider: Jacquelyn Garcia MD    Chief Complaint   Patient presents with   • Chest Pain   • Shortness of Breath       Chief complaint/ Reason for Consultation chest pain and shortness of breath      History of Present Illness  Body mass index is 29.8 kg/m². BMI is above normal parameters. Recommendations include: exercise counseling, nutrition counseling and referral to primary care.    70 years old patient known to Dr. Squires presented for evaluation of chest pain described as sharp to pressure-like feeling with associated shortness of breath for further evaluation to [facility] patient given aspirin and bronchodilators with a significant provement in shortness of breath or chest pain.  He has some radiation to the left upper extremity which was sharp in quality.  No nauseous or vomiting reported.  No palpitation fluttering.  He is a pain-free at the time of evaluation.  EKG paced rhythm and cardiac enzymes are negative.  His hemoglobin is slightly greater than 8.0 he has a history of chronic anemia.  His BNP is not significantly elevated clinically is not in heart failure.  No syncope or near syncopal episode reported.  The patient has background history of CAD s/p PCI, chronic renal insufficiency, history of chronic anemia, bioprosthetic aortic valve, history of atrial fibrillation/atrial flutter status post EP study and ablations, hypertension with hypertensive heart disease, hyperlipidemia, diabetes          Ref Range & Units 09:29 07:27 5mo ago   Troponin T   0.000 - 0.030 ng/mL 0.012  0.015  <0.010           Ref Range & Units 07:27 5mo ago   proBNP   0.0 - 900.0 pg/mL 1,193.0High   3,028.0High     Resulting Agency  [facility]     RBC   4.14 - 5.80 10*6/mm3 3.25Low    4.64    Hemoglobin   13.0 - 17.7 g/dL 8.2Low   9.9Low  R  12.2Low     Hematocrit   37.5 - 51.0 % 26.8Low   31.4Low  R  40.7 CM    MCV   79.0 - 97.0 fL 82.5   87.7    MCH   26.6 - 33.0 pg 25.2Low    26.3Low     MCHC   31.5 - 35.7 g/dL 30.6Low           9/16/2020  Successful atherectomy and PCI to the proximal LAD and proximal to mid left circumflex.  3.5 x 28 mm Xience Dolores drug-eluting stent was deployed and the LAD  3.0 x 33 mm Xience Dolores drug-eluting stent was deployed in the circumflex.     Transesophageal echo 12/8/2020 at [facility]    Conclusions  The estimated ejection fraction is 50-55%.  The left atrium is moderately dilated.  There is no thrombus visualized in the left atrial appendage.   A pacemaker wire is visualized in the right ventricle.  The right atrium is moderately dilated.   A pacemaker wire is visualized in the right atrium.  A bileaflet mechanical aortic valve is present.   The bio-prosthetic aortic valve appears to be functioning normally.   There is a trace of mitral regurgitation.  There is trace tricuspid regurgitation present.  There is no pericardial effusion.      Past medical/surgical history:  • Allergic state     • Arthritis     • Cardiac arrhythmia       Atrial fibrillation   • Cardiac pacemaker in situ     • Cataract     • Congestive heart failure     • Coronary artery disease     • Cough       allergies   • Diabetes mellitus     • Hypercholesterolemia     • Hypertension       has been on b/p meds for greater than 10 years   • Kidney stones     • Myocardial infarction     • Renal insufficiency       stage 3   • Type 2 diabetes mellitus       Past Medical History:   Diagnosis Date   • Actinic keratosis    • Acute bronchitis    • Acute upper respiratory infection     Acute upper respiratory infection,  unspecified   • Aortic valve stenosis     Aortic valve stenosis - post AVR   • Arthritis    • Atrial fibrillation (CMS/HCC)     Atrial fibrillation - RSR post MAZE   • Bicuspid aortic valve     Bicuspid aortic valve - AVR replacement 12/16/14   • CHF (congestive heart failure) (CMS/[facility])    • Coronary arteriosclerosis    • Coronary atherosclerosis of native coronary artery     Coronary atherosclerosis of native coronary artery - S/P 3.0 x 23mm Xience V stent to mid RCA 12/11 (NSTEMI   • Cortical senile cataract    • Crushing injury     Crushing injury - left 4th finger   • Diabetes mellitus (CMS/[facility])     Diabetes mellitus - no retinopathy on today's exam   • 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   • History of echocardiogram 12/14/2011    Echocardiogram W/O color flow 24310 (1) - Normal LV systolic function with diastolic dysfunction. Biscupid aortic valve w/mild to moderate aortic stenosis and mild aortic insufficiency. Mild left atrial enlargement.   • Hyperlipidemia     Hyperlipidemia - cannot tolerate higher dose than 20 mg lipitor   • Hypermetropia    • Inguinal hernia, recurrent     Inguinal hernia - recurrent   • Microalbuminuria    • Need for immunization against influenza     Needs influenza immunization   • Postoperative pain     Postoperative pain - 12/16/14 AVR Medtronic concave Aortic 25 mm Mosaic [id number] model [id number] serial [id number] MAZE Resolved   • Postoperative visit     Postoperative visit - 12/16/14 AVR Medtronic  concave Aortic 25 mm Mosaic [id number] model [id number] serial [id number] MAZE   • Scalding pain on urination    • Type 2 diabetes mellitus (CMS/HCC)        Past Surgical History:   Procedure Laterality Date   • ANGIOPLASTY  07/16/2000    Angioplasty, coronary (1) - wide open patency to anterior descending artery at the inflation sites. No intimal luminal irregularities, tears, or residual stenosis found   • AORTIC VALVE REPAIR/REPLACEMENT  12/16/2014    Replacement of aortic valve (1) - 12/16/14 AVR Medtronic concave Aortic 25 mm Mosaic [id number] model [id number] serial [id number] MAZE   • CARDIAC CATHETERIZATION  12/10/2014    Cardiac cath 14528 (3) - Patent right coronary artery stent. MInimal nonobstructive disease in the LAD and left circumflex.   • CARDIAC SURGERY      valve   • CHOLECYSTECTOMY     • COLONOSCOPY N/A 11/20/2017    Procedure: COLONOSCOPY;  Surgeon: Rony Pena MD;  Location: [facility] ENDOSCOPY;  Service:    • ENDOSCOPY  10/23/2007    Colon endoscopy 09378 (1) - remarkably poor [rep potentially obscures any small polyps that might be present. Colonic mucosa appeared entirely NRL. There were no masses or polyps found   • EXCISION LESION  07/31/2015    Destruction of Premalignant Lesion (1st) 64492 (1) - CYNTHIA POWELL (FP)   • EXCISION LESION  07/31/2015    Destruction of Premalignant Lesions (2-14) 87866 (1) - CYNTHIA POWELL (FP)   • HERNIA REPAIR     • INGUINAL HERNIA REPAIR  11/05/2007    Inguinal hernia, repair (1) - Sutured right inguinal hernia repair. Direct inguinal hernia, recurring in previous mesh repair   • KNEE SURGERY  08/06/1985    Knee Surgery (1) - Debridement, arthroscopy, abrasive chondoplasty. Degenerative chondromalacia, medial femoral condyle, left knee   • VASECTOMY  03/24/1996    Vasectomy - ligate (1) - elective       Allergies   Allergen Reactions   • Evolocumab Other (See Comments)     itching   • Lipitor [Atorvastatin] Myalgia   • Lisinopril Cough       No current facility-administered  medications on file prior to encounter.      Current Outpatient Medications on File Prior to Encounter   Medication Sig Dispense Refill   • Cholecalciferol (VITAMIN D3 PO) Take 4,000 Units by mouth Daily.     • dilTIAZem CD (CARDIZEM CD) 120 MG 24 hr capsule Take 120 mg by mouth Daily.     • fenofibrate 160 MG tablet TAKE 1 TABLET BY MOUTH DAILY 90 tablet 1   • furosemide (LASIX) 40 MG tablet Take 1 tablet by mouth Daily for 5 days. 5 tablet 0   • glipiZIDE (GLUCOTROL) 10 MG tablet Take 1 tablet by mouth 2 (Two) Times a Day. 180 tablet 3   • Icosapent Ethyl (VASCEPA PO) Take 1 g by mouth 2 (two) times a day. 2 capsules bid     • insulin glargine (LANTUS) 100 UNIT/ML injection Inject 38 Units under the skin Daily. (Patient taking differently: Inject 25 Units under the skin into the appropriate area as directed 2 (Two) Times a Day.) 11 mL 5   • INSULIN LISPRO SC Inject 20 Units under the skin into the appropriate area as directed 3 (Three) Times a Day.     • losartan (COZAAR) 50 MG tablet TAKE 1 TABLET BY MOUTH DAILY 90 tablet 1   • metOLazone (ZAROXOLYN) 5 MG tablet Take 5 mg by mouth Daily.     • metoprolol succinate XL (TOPROL-XL) 100 MG 24 hr tablet Take 100 mg by mouth Daily.     • Multiple Vitamins-Minerals (ZINC PO) Take  by mouth Daily. Unknown dosage     • multivitamin with minerals (CENTRUM ULTRA MENS PO) Take 1 tablet by mouth Daily.     • rivaroxaban (Xarelto) 20 MG tablet Take 20 mg by mouth Daily.     • ticagrelor (BRILINTA) 90 MG tablet tablet Take 90 mg by mouth 2 (Two) Times a Day.     • amiodarone (PACERONE) 200 MG tablet Take 200 mg by mouth Daily.     • aspirin 81 MG EC tablet Take 81 mg by mouth Daily.     • Blood Glucose Monitoring Suppl (ONE TOUCH ULTRA 2) w/Device kit USE TO TEST BLOOD SUGAR TWICE DAILY 1 each 5   • fluticasone (FLONASE) 50 MCG/ACT nasal spray 2 sprays into each nostril daily. Med Name: fluticasone 50 mcg/actuation nasal spray,suspension     • LANTUS 100 UNIT/ML injection  "ADMINISTER 30 UNITS UNDER THE SKIN DAILY 10 mL 0   • Multiple Vitamins-Iron (DAILY MULTI-VITAMINS/IRON PO) Take 1 tablet by mouth daily.     • ONE TOUCH ULTRA TEST test strip USE TO TEST BLOOD SUGAR TWICE DAILY 100 each 0       Social History     Socioeconomic History   • Marital status:      Spouse name: Not on file   • Number of children: Not on file   • Years of education: Not on file   • Highest education level: Not on file   Tobacco Use   • Smoking status: Former Smoker   • Smokeless tobacco: Never Used   Substance and Sexual Activity   • Alcohol use: Yes     Comment: 1 beer daily   • Drug use: No   • Sexual activity: Defer     Comment:            REVIEW OF SYSTEMS:   ROS  Constitutional: Negative for chills.   HENT: Negative for congestion, sinus pressure, sinus pain, sneezing and sore throat.    Respiratory: Positive for cough, chest tightness and shortness of breath.    Cardiovascular: Positive for chest pain and leg swelling.   Gastrointestinal: Negative for abdominal pain, nausea and vomiting.   Genitourinary: Negative for flank pain.   Musculoskeletal: Negative for joint swelling.   Neurological: Negative for syncope and headaches.   Psychiatric/Behavioral: Negative for agitation.      Objective:     Vitals:    02/19/21 0831 02/19/21 0920 02/19/21 0955 02/19/21 1109   BP: 125/57 127/58  121/58   BP Location:  Left arm  Left arm   Patient Position:  Sitting  Lying   Pulse: 62 60 78 63   Resp:  18  18   Temp:  96 °F (35.6 °C)  97.6 °F (36.4 °C)   TempSrc:  Oral  Temporal   SpO2: 100% 100%  97%   Weight: 88.9 kg (196 lb)      Height: 172.7 cm (68\")        Body mass index is 29.8 kg/m².  Flowsheet Rows      First Filed Value   Admission Height  172.7 cm (68\") Documented at 02/19/2021 0831   Admission Weight  88.9 kg (196 lb) Documented at 02/19/2021 0831          Intake/Output Summary (Last 24 hours) at 2/19/2021 1219  Last data filed at 2/19/2021 1109  Gross per 24 hour   Intake --   Output 400 " ml   Net -400 ml         Physical Exam   Physical Exam  Constitutional:       Appearance: He is well-developed.   HENT:      Head: Normocephalic and atraumatic.  Conjunctive is pink oral mucosa moist no jugular distention    Neck:      Musculoskeletal: Normal range of motion and neck supple.   Cardiovascular:      Rate and Rhythm: Normal rate and regular rhythm.  No S3 pericardial rub positive systolic murmur at the base of the heart     Heart sounds: Normal heart sounds.   Pulmonary:      Effort: Pulmonary effort is normal. No tachypnea or accessory muscle usage.      Breath sounds: Rhonchi present.  No rales  Abdominal:      General: Bowel sounds are normal.      Palpations: Abdomen is soft.   Musculoskeletal:       Mild lower extremity edema t.   Skin:     Capillary Refill: Capillary refill takes less than 2 seconds.   Neurological:      General: No focal deficit present.      Mental Status: He is alert and oriented to person, place, and time.   Psychiatric:         Mood and Affect: Mood normal.   Radiology Review    XR Chest 1 View   Final Result   No evidence of active disease.      Electronically signed by:  Giacomo Tarango MD  2/19/2021 7:45 AM CST   Workstation: [id number]          Lab Results:  Lab Results (last 24 hours)     Procedure Component Value Units Date/Time    Troponin [[id number]] Collected: 02/19/21 1200    Specimen: Blood Updated: 02/19/21 1204    Troponin [[id number]]  (Normal) Collected: 02/19/21 0929    Specimen: Blood Updated: 02/19/21 1009     Troponin T 0.012 ng/mL     Narrative:      Troponin T Reference Range:  <= 0.03 ng/mL-   Negative for AMI  >0.03 ng/mL-     Abnormal for myocardial necrosis.  Clinicians would have to utilize clinical acumen, EKG, Troponin and serial changes to determine if it is an Acute Myocardial Infarction or myocardial injury due to an underlying chronic condition.       Results may be falsely decreased if patient taking Biotin.      Extra Tubes [[id number]]  Collected: 02/19/21 0732    Specimen: Blood, Venous Line Updated: 02/19/21 0845    Narrative:      The following orders were created for panel order Extra Tubes.  Procedure                               Abnormality         Status                     ---------                               -----------         ------                     Light Blue Top[[id number]]                                   Final result               Gold Top - SST[[id number]]                                   Final result                 Please view results for these tests on the individual orders.    Light Blue Top [[id number]] Collected: 02/19/21 0732    Specimen: Blood Updated: 02/19/21 0845     Extra Tube hold for add-on     Comment: Auto resulted       Gold Top - SST [[id number]] Collected: 02/19/21 0732    Specimen: Blood Updated: 02/19/21 0845     Extra Tube Hold for add-ons.     Comment: Auto resulted.       COVID-19 and FLU A/B PCR - Swab, Nasopharynx [[id number]]  (Normal) Collected: 02/19/21 0746    Specimen: Swab from Nasopharynx Updated: 02/19/21 0812     COVID19 Not Detected     Influenza A PCR Not Detected     Influenza B PCR Not Detected    Narrative:      Fact sheet for providers: https://www.fda.gov/media/125046/download    Fact sheet for patients: https://www.fda.gov/media/176840/download    Test performed by PCR.    Comprehensive Metabolic Panel [[id number]]  (Abnormal) Collected: 02/19/21 0727    Specimen: Blood from Arm, Right Updated: 02/19/21 0800     Glucose 203 mg/dL      BUN 52 mg/dL      Creatinine 2.87 mg/dL      Sodium 140 mmol/L      Potassium 3.7 mmol/L      Chloride 100 mmol/L      CO2 29.0 mmol/L      Calcium 10.5 mg/dL      Total Protein 7.5 g/dL      Albumin 4.40 g/dL      ALT (SGPT) 13 U/L      AST (SGOT) 18 U/L      Alkaline Phosphatase 47 U/L      Total Bilirubin 0.3 mg/dL      eGFR Non African Amer 22 mL/min/1.73      Globulin 3.1 gm/dL      A/G Ratio 1.4 g/dL      BUN/Creatinine Ratio 18.1     Anion Gap 11.0  mmol/L     Narrative:      GFR Normal >60  Chronic Kidney Disease <60  Kidney Failure <15      Troponin [[id number]]  (Normal) Collected: 02/19/21 0727    Specimen: Blood from Arm, Right Updated: 02/19/21 0800     Troponin T 0.015 ng/mL     Narrative:      Troponin T Reference Range:  <= 0.03 ng/mL-   Negative for AMI  >0.03 ng/mL-     Abnormal for myocardial necrosis.  Clinicians would have to utilize clinical acumen, EKG, Troponin and serial changes to determine if it is an Acute Myocardial Infarction or myocardial injury due to an underlying chronic condition.       Results may be falsely decreased if patient taking Biotin.      Magnesium [[id number]]  (Normal) Collected: 02/19/21 0727    Specimen: Blood from Arm, Right Updated: 02/19/21 0800     Magnesium 2.2 mg/dL     BNP [[id number]]  (Abnormal) Collected: 02/19/21 0727    Specimen: Blood from Arm, Right Updated: 02/19/21 0758     proBNP 1,193.0 pg/mL     Narrative:      Among patients with dyspnea, NT-proBNP is highly sensitive for the detection of acute congestive heart failure. In addition NT-proBNP of <300 pg/ml effectively rules out acute congestive heart failure with 99% negative predictive value.    Results may be falsely decreased if patient taking Biotin.      Urinalysis With Microscopic If Indicated (No Culture) - Urine, Clean Catch [[id number]]  (Normal) Collected: 02/19/21 0746    Specimen: Urine, Clean Catch Updated: 02/19/21 0754     Color, UA Yellow     Appearance, UA Clear     pH, UA 6.0     Specific Gravity, UA 1.008     Glucose, UA Negative     Ketones, UA Negative     Bilirubin, UA Negative     Blood, UA Negative     Protein, UA Negative     Leuk Esterase, UA Negative     Nitrite, UA Negative     Urobilinogen, UA 0.2 E.U./dL    Narrative:      Urine microscopic not indicated.    CBC & Differential [[id number]]  (Abnormal) Collected: 02/19/21 0727    Specimen: Blood Updated: 02/19/21 0740    Narrative:      The following orders were created for  panel order CBC & Differential.  Procedure                               Abnormality         Status                     ---------                               -----------         ------                     CBC Auto Differential[[id number]]        Abnormal            Final result                 Please view results for these tests on the individual orders.    CBC Auto Differential [[id number]]  (Abnormal) Collected: 02/19/21 0727    Specimen: Blood Updated: 02/19/21 0740     WBC 8.03 10*3/mm3      RBC 3.25 10*6/mm3      Hemoglobin 8.2 g/dL      Hematocrit 26.8 %      MCV 82.5 fL      MCH 25.2 pg      MCHC 30.6 g/dL      RDW 15.1 %      RDW-SD 45.3 fl      MPV 10.0 fL      Platelets 332 10*3/mm3      Neutrophil % 59.2 %      Lymphocyte % 20.7 %      Monocyte % 13.1 %      Eosinophil % 6.0 %      Basophil % 0.6 %      Immature Grans % 0.4 %      Neutrophils, Absolute 4.76 10*3/mm3      Lymphocytes, Absolute 1.66 10*3/mm3      Monocytes, Absolute 1.05 10*3/mm3      Eosinophils, Absolute 0.48 10*3/mm3      Basophils, Absolute 0.05 10*3/mm3      Immature Grans, Absolute 0.03 10*3/mm3      nRBC 0.0 /100 WBC           I personally viewed and interpreted the patient's EKG/Telemetry data       Assessment/Plan:       #1 chest pain with pressure to sharp in quality    70 years old patient with a background history of CAD s/p PCI to LAD left circumflex system who presented evaluation chest pain described as pressure to sharp in quality with radiation to left upper extremity.  EKG paced rhythm no acute ST-T wave changes cardiac enzymes are negative.  Patient has associated shortness of breath and given bronchodilator and with significant provement in breathing and chest discomfort.  Patient have previous history of smoking but fortunately discontinued.  He has stage III chronic kidney disease and and blood counts bit on the lower side.  Patient known to Dr. Squires and if any work-up needed  prefer to get at [facility] I have  briefly discussed with Dr. Squires over the phone.  Keep him tonight and will add Imdur.  We will arrange an echocardiogram to reassess the biventricular functions.    #2 CAD s/p PCI  We will continue antiplatelet agent with the Brilinta    #3 hypertension with hypertensive heart disease    Patient was being managed as an outpatient with metoprolol, losartan.    The patient was educated again regarding risk factor lifestyle modification.  Patient was educated regarding the food containing high sodium and salt content.    #4 bioprosthetic aortic valve  Arrange an echocardiogram to reassess the valvular status    Dyspnea    Clinically patient is not in congestive heart failure and will continue diuretics as before      Thank you for allowing me to participate in the care of Kiran Moss. Feel free to contact me directly with any further questions or concerns.    Lino Ibarra MD  02/19/21  12:19 CST.      Part of this note may be an electronic transcription/translation of spoken language to printed text using the Dragon Dictation system.

## 2021-02-20 VITALS
OXYGEN SATURATION: 99 % | HEART RATE: 60 BPM | WEIGHT: 196.8 LBS | BODY MASS INDEX: 29.83 KG/M2 | RESPIRATION RATE: 17 BRPM | TEMPERATURE: 97 F | SYSTOLIC BLOOD PRESSURE: 130 MMHG | DIASTOLIC BLOOD PRESSURE: 59 MMHG | HEIGHT: 68 IN

## 2021-02-20 LAB
ANION GAP SERPL CALCULATED.3IONS-SCNC: 10 MMOL/L (ref 5–15)
BASOPHILS # BLD AUTO: 0.1 10*3/MM3 (ref 0–0.2)
BASOPHILS NFR BLD AUTO: 1.2 % (ref 0–1.5)
BUN SERPL-MCNC: 54 MG/DL (ref 8–23)
BUN/CREAT SERPL: 19 (ref 7–25)
CALCIUM SPEC-SCNC: 10.4 MG/DL (ref 8.6–10.5)
CHLORIDE SERPL-SCNC: 100 MMOL/L (ref 98–107)
CO2 SERPL-SCNC: 30 MMOL/L (ref 22–29)
CREAT SERPL-MCNC: 2.84 MG/DL (ref 0.76–1.27)
DEPRECATED RDW RBC AUTO: 45.4 FL (ref 37–54)
EOSINOPHIL # BLD AUTO: 0.64 10*3/MM3 (ref 0–0.4)
EOSINOPHIL NFR BLD AUTO: 7.7 % (ref 0.3–6.2)
ERYTHROCYTE [DISTWIDTH] IN BLOOD BY AUTOMATED COUNT: 15.4 % (ref 12.3–15.4)
GFR SERPL CREATININE-BSD FRML MDRD: 22 ML/MIN/1.73
GLUCOSE BLDC GLUCOMTR-MCNC: 190 MG/DL (ref 70–130)
GLUCOSE BLDC GLUCOMTR-MCNC: 286 MG/DL (ref 70–130)
GLUCOSE SERPL-MCNC: 170 MG/DL (ref 65–99)
HCT VFR BLD AUTO: 25.9 % (ref 37.5–51)
HGB BLD-MCNC: 8 G/DL (ref 13–17.7)
IMM GRANULOCYTES # BLD AUTO: 0.03 10*3/MM3 (ref 0–0.05)
IMM GRANULOCYTES NFR BLD AUTO: 0.4 % (ref 0–0.5)
LYMPHOCYTES # BLD AUTO: 1.77 10*3/MM3 (ref 0.7–3.1)
LYMPHOCYTES NFR BLD AUTO: 21.4 % (ref 19.6–45.3)
MCH RBC QN AUTO: 25 PG (ref 26.6–33)
MCHC RBC AUTO-ENTMCNC: 30.9 G/DL (ref 31.5–35.7)
MCV RBC AUTO: 80.9 FL (ref 79–97)
MONOCYTES # BLD AUTO: 0.98 10*3/MM3 (ref 0.1–0.9)
MONOCYTES NFR BLD AUTO: 11.9 % (ref 5–12)
NEUTROPHILS NFR BLD AUTO: 4.75 10*3/MM3 (ref 1.7–7)
NEUTROPHILS NFR BLD AUTO: 57.4 % (ref 42.7–76)
NRBC BLD AUTO-RTO: 0 /100 WBC (ref 0–0.2)
PLATELET # BLD AUTO: 376 10*3/MM3 (ref 140–450)
PMV BLD AUTO: 10.1 FL (ref 6–12)
POTASSIUM SERPL-SCNC: 4 MMOL/L (ref 3.5–5.2)
RBC # BLD AUTO: 3.2 10*6/MM3 (ref 4.14–5.8)
SODIUM SERPL-SCNC: 140 MMOL/L (ref 136–145)
WBC # BLD AUTO: 8.27 10*3/MM3 (ref 3.4–10.8)

## 2021-02-20 PROCEDURE — 85025 COMPLETE CBC W/AUTO DIFF WBC: CPT | Performed by: STUDENT IN AN ORGANIZED HEALTH CARE EDUCATION/TRAINING PROGRAM

## 2021-02-20 PROCEDURE — 80048 BASIC METABOLIC PNL TOTAL CA: CPT | Performed by: STUDENT IN AN ORGANIZED HEALTH CARE EDUCATION/TRAINING PROGRAM

## 2021-02-20 PROCEDURE — 63710000001 INSULIN ASPART PER 5 UNITS: Performed by: STUDENT IN AN ORGANIZED HEALTH CARE EDUCATION/TRAINING PROGRAM

## 2021-02-20 PROCEDURE — 63710000001 INSULIN DETEMIR PER 5 UNITS: Performed by: HOSPITALIST

## 2021-02-20 PROCEDURE — 82962 GLUCOSE BLOOD TEST: CPT

## 2021-02-20 PROCEDURE — G0378 HOSPITAL OBSERVATION PER HR: HCPCS

## 2021-02-20 RX ORDER — ISOSORBIDE MONONITRATE 30 MG/1
30 TABLET, EXTENDED RELEASE ORAL
Qty: 30 TABLET | Refills: 1 | Status: SHIPPED | OUTPATIENT
Start: 2021-02-21

## 2021-02-20 RX ADMIN — DILTIAZEM HYDROCHLORIDE 120 MG: 120 CAPSULE, COATED, EXTENDED RELEASE ORAL at 09:11

## 2021-02-20 RX ADMIN — FUROSEMIDE 40 MG: 40 TABLET ORAL at 09:11

## 2021-02-20 RX ADMIN — TICAGRELOR 90 MG: 90 TABLET ORAL at 09:11

## 2021-02-20 RX ADMIN — INSULIN ASPART 4 UNITS: 100 INJECTION, SOLUTION INTRAVENOUS; SUBCUTANEOUS at 10:44

## 2021-02-20 RX ADMIN — INSULIN ASPART 2 UNITS: 100 INJECTION, SOLUTION INTRAVENOUS; SUBCUTANEOUS at 09:10

## 2021-02-20 RX ADMIN — ISOSORBIDE MONONITRATE 30 MG: 30 TABLET, EXTENDED RELEASE ORAL at 09:11

## 2021-02-20 RX ADMIN — INSULIN DETEMIR 25 UNITS: 100 INJECTION, SOLUTION SUBCUTANEOUS at 09:13

## 2021-02-20 RX ADMIN — SODIUM CHLORIDE, PRESERVATIVE FREE 10 ML: 5 INJECTION INTRAVENOUS at 09:13

## 2021-02-20 NOTE — PLAN OF CARE
Problem: Adult Inpatient Plan of Care  Goal: Plan of Care Review  Outcome: Ongoing, Progressing  Goal: Patient-Specific Goal (Individualized)  Outcome: Ongoing, Progressing  Goal: Absence of Hospital-Acquired Illness or Injury  Outcome: Ongoing, Progressing  Intervention: Identify and Manage Fall Risk  Recent Flowsheet Documentation  Taken 2/19/2021 2200 by Sherly Gibson, RN  Safety Promotion/Fall Prevention: safety round/check completed  Taken 2/19/2021 2105 by Sherly Gibson, RN  Safety Promotion/Fall Prevention: safety round/check completed  Taken 2/19/2021 2051 by Sherly Gibson, RN  Safety Promotion/Fall Prevention: safety round/check completed  Taken 2/19/2021 1900 by Sherly Gibson, RN  Safety Promotion/Fall Prevention: safety round/check completed  Goal: Optimal Comfort and Wellbeing  Outcome: Ongoing, Progressing  Goal: Readiness for Transition of Care  Outcome: Ongoing, Progressing     Problem: Chest Pain  Goal: Resolution of Chest Pain Symptoms  Outcome: Ongoing, Progressing     Problem: Diabetes Comorbidity  Goal: Blood Glucose Level Within Desired Range  Outcome: Ongoing, Progressing     Problem: Heart Failure Comorbidity  Goal: Maintenance of Heart Failure Symptom Control  Outcome: Ongoing, Progressing     Problem: Hypertension Comorbidity  Goal: Blood Pressure in Desired Range  Outcome: Ongoing, Progressing   Goal Outcome Evaluation:

## 2021-02-20 NOTE — DISCHARGE SUMMARY
DISCHARGE SUMMARY    NAME: Kiran Moss   PHYSICIAN: Gavin Moreau MD  : 1950  MRN: [id number]    ADMITTED: 2021   DISCHARGED:  21    ADMISSION DIAGNOSES:   Present on Admission:  • Chest pain, rule out acute myocardial infarction  • Benign non-nodular prostatic hyperplasia with lower urinary tract symptoms  • Type 2 diabetes mellitus with microalbuminuria (CMS/HCC)  • Hyperlipidemia  • Atrial fibrillation (CMS/HCC)  • Stage 3 chronic kidney disease (CMS/HCC)  • Coronary atherosclerosis of native coronary artery    DISCHARGE DIAGNOSES:     Hyperlipidemia    Benign non-nodular prostatic hyperplasia with lower urinary tract symptoms    Type 2 diabetes mellitus with microalbuminuria (CMS/HCC)    Coronary atherosclerosis of native coronary artery    Bicuspid aortic valve    Atrial fibrillation (CMS/HCC)    Stage 3 chronic kidney disease (CMS/HCC)    Chest pain, rule out acute myocardial infarction      SERVICE: Medicine. Attending  Gavin Moreau MD    CONSULTS:   Consult Orders (all) (From admission, onward)     Start     Ordered    21 09  Inpatient Cardiology Consult  Once     Specialty:  Cardiology  Provider:  Lino Ibarra MD    21 09                PROCEDURES:   Imaging Results (Last 7 Days)     Procedure Component Value Units Date/Time    XR Chest 1 View [[id number]] Collected: 21     Updated: 21    Narrative:      Chest x-ray single view.           CLINICAL INDICATION: Chest pain.        COMPARISON: Chest September 10, 2020     FINDINGS: Cardiac silhouette is normal in size. Pulmonary  vascularity is unremarkable.   Midline sternal sutures from prior cardiac surgery. Pacemaker  leads right atrium and right ventricle.    Lung fields are clear.  No focal infiltrate or consolidation.  No pleural effusion.  No  pneumothorax.      Impression:      No evidence of active disease.    Electronically signed by:  Giacomo Tarango MD  2021 7:45 AM  CST  Workstation: [id number]          HISTORY OF PRESENT ILLNESS: *Copied from Jacquelyn Garcia MD's H&P*    Mr. Moss is a 70 year old man who presented with chest pain that started this morning. He has a history of CAD s/p stents, a fib on anticoagulation, s/p aortic valve replacement, CHF, HLD, HTN, DM with recently admission to the hospital for acute heart failure. He had chest pain with ambulation this morning when he was going to work. He had some associated shortness of breath. The pain radiated to his neck. He denies any fevers or chills.      In the ED, trop was negative x2. BNP 1193. Creatinine was noted to be 2.87 which is higher than his baseline. He was recently started on metolazone after his recent heart failure exacerbation.     DIAGNOSTIC DATA:   Lab Results (last 7 days)     Procedure Component Value Units Date/Time    Basic Metabolic Panel [[id number]]  (Abnormal) Collected: 02/20/21 0607    Specimen: Blood Updated: 02/20/21 0710     Glucose 170 mg/dL      BUN 54 mg/dL      Creatinine 2.84 mg/dL      Sodium 140 mmol/L      Potassium 4.0 mmol/L      Chloride 100 mmol/L      CO2 30.0 mmol/L      Calcium 10.4 mg/dL      eGFR Non African Amer 22 mL/min/1.73      BUN/Creatinine Ratio 19.0     Anion Gap 10.0 mmol/L     Narrative:      GFR Normal >60  Chronic Kidney Disease <60  Kidney Failure <15      POC Glucose Once [[id number]]  (Abnormal) Collected: 02/20/21 0640    Specimen: Blood Updated: 02/20/21 0700     Glucose 190 mg/dL      Comment: RN NotifiedOperator: [id number] JULIA ELRITAMeter ID: [id number]       CBC & Differential [[id number]]  (Abnormal) Collected: 02/20/21 0607    Specimen: Blood Updated: 02/20/21 0651    Narrative:      The following orders were created for panel order CBC & Differential.  Procedure                               Abnormality         Status                     ---------                               -----------         ------                     CBC Auto  Differential[[id number]]        Abnormal            Final result                 Please view results for these tests on the individual orders.    CBC Auto Differential [[id number]]  (Abnormal) Collected: 02/20/21 0607    Specimen: Blood Updated: 02/20/21 0651     WBC 8.27 10*3/mm3      RBC 3.20 10*6/mm3      Hemoglobin 8.0 g/dL      Hematocrit 25.9 %      MCV 80.9 fL      MCH 25.0 pg      MCHC 30.9 g/dL      RDW 15.4 %      RDW-SD 45.4 fl      MPV 10.1 fL      Platelets 376 10*3/mm3      Neutrophil % 57.4 %      Lymphocyte % 21.4 %      Monocyte % 11.9 %      Eosinophil % 7.7 %      Basophil % 1.2 %      Immature Grans % 0.4 %      Neutrophils, Absolute 4.75 10*3/mm3      Lymphocytes, Absolute 1.77 10*3/mm3      Monocytes, Absolute 0.98 10*3/mm3      Eosinophils, Absolute 0.64 10*3/mm3      Basophils, Absolute 0.10 10*3/mm3      Immature Grans, Absolute 0.03 10*3/mm3      nRBC 0.0 /100 WBC     POC Glucose Once [[id number]]  (Abnormal) Collected: 02/19/21 1937    Specimen: Blood Updated: 02/19/21 2120     Glucose 420 mg/dL      Comment: KRYS Acosta RNOperator: [id number] NOEL REBECCAMeter ID: [id number]       POC Glucose Once [[id number]]  (Abnormal) Collected: 02/19/21 1615    Specimen: Blood Updated: 02/19/21 1630     Glucose 326 mg/dL      Comment: RN NotifiedOperator: [id number] XIMENA TIFFANYMeter ID: [id number]       Troponin [[id number]]  (Normal) Collected: 02/19/21 1200    Specimen: Blood Updated: 02/19/21 1232     Troponin T 0.013 ng/mL     Narrative:      Troponin T Reference Range:  <= 0.03 ng/mL-   Negative for AMI  >0.03 ng/mL-     Abnormal for myocardial necrosis.  Clinicians would have to utilize clinical acumen, EKG, Troponin and serial changes to determine if it is an Acute Myocardial Infarction or myocardial injury due to an underlying chronic condition.       Results may be falsely decreased if patient taking Biotin.      POC Glucose Once [[id number]]  (Abnormal) Collected: 02/19/21 1026     Specimen: Blood Updated: 02/19/21 1221     Glucose 203 mg/dL      Comment: RN NotifiedOperator: [id number] XIMENA Candelaria ID: [id number]       Troponin [[id number]]  (Normal) Collected: 02/19/21 0929    Specimen: Blood Updated: 02/19/21 1009     Troponin T 0.012 ng/mL     Narrative:      Troponin T Reference Range:  <= 0.03 ng/mL-   Negative for AMI  >0.03 ng/mL-     Abnormal for myocardial necrosis.  Clinicians would have to utilize clinical acumen, EKG, Troponin and serial changes to determine if it is an Acute Myocardial Infarction or myocardial injury due to an underlying chronic condition.       Results may be falsely decreased if patient taking Biotin.      Extra Tubes [[id number]] Collected: 02/19/21 0732    Specimen: Blood, Venous Line Updated: 02/19/21 0845    Narrative:      The following orders were created for panel order Extra Tubes.  Procedure                               Abnormality         Status                     ---------                               -----------         ------                     Light Blue Top[[id number]]                                   Final result               Gold Top - SST[[id number]]                                   Final result                 Please view results for these tests on the individual orders.    Light Blue Top [[id number]] Collected: 02/19/21 0732    Specimen: Blood Updated: 02/19/21 0845     Extra Tube hold for add-on     Comment: Auto resulted       Gold Top - SST [[id number]] Collected: 02/19/21 0732    Specimen: Blood Updated: 02/19/21 0845     Extra Tube Hold for add-ons.     Comment: Auto resulted.       COVID-19 and FLU A/B PCR - Swab, Nasopharynx [[id number]]  (Normal) Collected: 02/19/21 0746    Specimen: Swab from Nasopharynx Updated: 02/19/21 0812     COVID19 Not Detected     Influenza A PCR Not Detected     Influenza B PCR Not Detected    Narrative:      Fact sheet for providers: https://www.fda.gov/media/739987/download    Fact sheet for  patients: https://www.[employer].gov/media/303183/download    Test performed by PCR.    Comprehensive Metabolic Panel [[id number]]  (Abnormal) Collected: 02/19/21 0727    Specimen: Blood from Arm, Right Updated: 02/19/21 0800     Glucose 203 mg/dL      BUN 52 mg/dL      Creatinine 2.87 mg/dL      Sodium 140 mmol/L      Potassium 3.7 mmol/L      Chloride 100 mmol/L      CO2 29.0 mmol/L      Calcium 10.5 mg/dL      Total Protein 7.5 g/dL      Albumin 4.40 g/dL      ALT (SGPT) 13 U/L      AST (SGOT) 18 U/L      Alkaline Phosphatase 47 U/L      Total Bilirubin 0.3 mg/dL      eGFR Non African Amer 22 mL/min/1.73      Globulin 3.1 gm/dL      A/G Ratio 1.4 g/dL      BUN/Creatinine Ratio 18.1     Anion Gap 11.0 mmol/L     Narrative:      GFR Normal >60  Chronic Kidney Disease <60  Kidney Failure <15      Troponin [[id number]]  (Normal) Collected: 02/19/21 0727    Specimen: Blood from Arm, Right Updated: 02/19/21 0800     Troponin T 0.015 ng/mL     Narrative:      Troponin T Reference Range:  <= 0.03 ng/mL-   Negative for AMI  >0.03 ng/mL-     Abnormal for myocardial necrosis.  Clinicians would have to utilize clinical acumen, EKG, Troponin and serial changes to determine if it is an Acute Myocardial Infarction or myocardial injury due to an underlying chronic condition.       Results may be falsely decreased if patient taking Biotin.      Magnesium [[id number]]  (Normal) Collected: 02/19/21 0727    Specimen: Blood from Arm, Right Updated: 02/19/21 0800     Magnesium 2.2 mg/dL     BNP [[id number]]  (Abnormal) Collected: 02/19/21 0727    Specimen: Blood from Arm, Right Updated: 02/19/21 0758     proBNP 1,193.0 pg/mL     Narrative:      Among patients with dyspnea, NT-proBNP is highly sensitive for the detection of acute congestive heart failure. In addition NT-proBNP of <300 pg/ml effectively rules out acute congestive heart failure with 99% negative predictive value.    Results may be falsely decreased if patient taking Biotin.       Urinalysis With Microscopic If Indicated (No Culture) - Urine, Clean Catch [[id number]]  (Normal) Collected: 02/19/21 0746    Specimen: Urine, Clean Catch Updated: 02/19/21 0754     Color, UA Yellow     Appearance, UA Clear     pH, UA 6.0     Specific Gravity, UA 1.008     Glucose, UA Negative     Ketones, UA Negative     Bilirubin, UA Negative     Blood, UA Negative     Protein, UA Negative     Leuk Esterase, UA Negative     Nitrite, UA Negative     Urobilinogen, UA 0.2 E.U./dL    Narrative:      Urine microscopic not indicated.    CBC & Differential [[id number]]  (Abnormal) Collected: 02/19/21 0727    Specimen: Blood Updated: 02/19/21 0740    Narrative:      The following orders were created for panel order CBC & Differential.  Procedure                               Abnormality         Status                     ---------                               -----------         ------                     CBC Auto Differential[[id number]]        Abnormal            Final result                 Please view results for these tests on the individual orders.    CBC Auto Differential [[id number]]  (Abnormal) Collected: 02/19/21 0727    Specimen: Blood Updated: 02/19/21 0740     WBC 8.03 10*3/mm3      RBC 3.25 10*6/mm3      Hemoglobin 8.2 g/dL      Hematocrit 26.8 %      MCV 82.5 fL      MCH 25.2 pg      MCHC 30.6 g/dL      RDW 15.1 %      RDW-SD 45.3 fl      MPV 10.0 fL      Platelets 332 10*3/mm3      Neutrophil % 59.2 %      Lymphocyte % 20.7 %      Monocyte % 13.1 %      Eosinophil % 6.0 %      Basophil % 0.6 %      Immature Grans % 0.4 %      Neutrophils, Absolute 4.76 10*3/mm3      Lymphocytes, Absolute 1.66 10*3/mm3      Monocytes, Absolute 1.05 10*3/mm3      Eosinophils, Absolute 0.48 10*3/mm3      Basophils, Absolute 0.05 10*3/mm3      Immature Grans, Absolute 0.03 10*3/mm3      nRBC 0.0 /100 WBC           HOSPITAL COURSE:  Active Hospital Problems    Diagnosis POA   • Chest pain, rule out acute myocardial infarction  [R07.9] Yes   • Stage 3 chronic kidney disease (CMS/HCC) [N18.30] Yes   • Atrial fibrillation (CMS/HCC) [I48.91] Yes     Atrial fibrillation - RSR post MAZE     • Type 2 diabetes mellitus with microalbuminuria (CMS/HCC) [E11.29, R80.9] Yes   • Bicuspid aortic valve [Q23.1] Not Applicable     Bicuspid aortic valve - AVR replacement 12/16/14     • Coronary atherosclerosis of native coronary artery [I25.10] Yes     Coronary atherosclerosis of native coronary artery - S/P 3.0 x 23mm Xience V stent to mid RCA 12/11 (NSTEMI     • Benign non-nodular prostatic hyperplasia with lower urinary tract symptoms [N40.1] Yes     Post TUR 09/09/2016     • Hyperlipidemia [E78.5] Yes       1. Chest pain  Results for orders placed during the hospital encounter of 02/19/21   Adult Transthoracic Echo Complete w/ Color, Spectral and Contrast if Necessary Per Protocol    Narrative · Estimated left ventricular EF = 57% Left ventricular ejection fraction   appears to be 56 - 60%. Left ventricular systolic function is normal.  · Left ventricular diastolic function is consistent with (grade Ia w/high   LAP) impaired relaxation.  · Left atrial volume is moderately increased.  · There is a bioprosthetic aortic valve present.  · Ao max PG 41.7 mmHg Ao mean PG 22 mmHg Ao V2 VTI 80.3 cm MARTY(I,D) 1.3   cm^      2/20. Patient is pain free. He has been ambulating without pain. Per cards patient had ECHO and Imdur was added. Patient is to have close follow up with his primary cardiologist on discharge.    2/19.  -given his history, cardiology was consulted. Dr. Ibarra saw the patient. He recommended overnight observation, starting IMDUR and follow up with his cardiologist in [place].  -trop trended and negative  -MICHELLE  -continue Brilinta   -continue Toprol, Losartan  -obtain echo     2. A Fib  2/20. Patient to continue with cardiology on outpatient. Continue BB, CCB, and AC on discharge    2/19.   -cardiac monitoring  -continue BB,  "CCB  -Xarelto     3. DM  2/20. Levemir on discharge. Recommended to hold glipizide on discharge. Follow up closely with PCP on discharge.    2/19.   -levemir 20mg daily  -SSI      4. CARO on CKD  Results from last 7 days   Lab Units 02/20/21  0607 02/19/21  0727   CREATININE mg/dL 2.84* 2.87*   2/20. Cr stable. Patient has established with nephrology for CKD. Metolazone on hold on discharge. Patient to have close follow up with nephrology on discharge. Patient will need a BMP on discharge. He is to call his nephrologist on Monday to discuss when they want this to happen.    2/19.   -will stop metolazone and monitor renal function. Could be secondary to the addition of new diuretic.     PHYSICAL EXAM ON DISCHARGE:  /59 (BP Location: Left arm, Patient Position: Sitting)   Pulse 60   Temp 97 °F (36.1 °C) (Axillary)   Resp 17   Ht 172.7 cm (68\")   Wt 89.3 kg (196 lb 12.8 oz)   SpO2 99%   BMI 29.92 kg/m²      Physical Exam  Vitals signs and nursing note reviewed.   Constitutional:       General: He is not in acute distress.     Appearance: Normal appearance. He is not ill-appearing.   HENT:      Head: Normocephalic and atraumatic.      Right Ear: External ear normal.      Left Ear: External ear normal.      Nose: Nose normal.   Eyes:      Extraocular Movements: Extraocular movements intact.      Conjunctiva/sclera: Conjunctivae normal.      Pupils: Pupils are equal, round, and reactive to light.   Neck:      Musculoskeletal: Normal range of motion.   Cardiovascular:      Rate and Rhythm: Normal rate.   Pulmonary:      Effort: Pulmonary effort is normal.      Breath sounds: Normal breath sounds.   Abdominal:      General: Abdomen is flat. Bowel sounds are normal.      Palpations: Abdomen is soft.   Musculoskeletal: Normal range of motion.         General: No swelling.   Skin:     General: Skin is warm and dry.   Neurological:      General: No focal deficit present.      Mental Status: He is alert and oriented " to person, place, and time.      Gait: Gait normal.   Psychiatric:         Mood and Affect: Mood normal.         Behavior: Behavior normal.         CONDITION ON DISCHARGE:   Stable    Review of Systems   Constitutional: Negative for activity change, appetite change, fatigue and fever.   Respiratory: Negative for cough and shortness of breath.    Cardiovascular: Negative for chest pain (Patient has ambulated multiple times without pain).   Gastrointestinal: Negative for abdominal pain and nausea.   Genitourinary: Negative for difficulty urinating and dysuria.   Musculoskeletal: Positive for arthralgias. Negative for gait problem.   Skin: Negative for color change and rash.   Neurological: Negative for dizziness, weakness and headaches.   Psychiatric/Behavioral: Negative for agitation, confusion and sleep disturbance.       DISPOSITION:  Home or Self Care    DISCHARGE MEDICATIONS     Discharge Medications      New Medications      Instructions Start Date   isosorbide mononitrate 30 MG 24 hr tablet  Commonly known as: IMDUR   30 mg, Oral, Every 24 Hours Scheduled   Start Date: February 21, 2021        Changes to Medications      Instructions Start Date   insulin glargine 100 UNIT/ML injection  Commonly known as: Lantus  What changed:   · how much to take  · when to take this   38 Units, Subcutaneous, Daily         Continue These Medications      Instructions Start Date   dilTIAZem  MG 24 hr capsule  Commonly known as: CARDIZEM CD   120 mg, Oral, Daily      fenofibrate 160 MG tablet   160 mg, Oral, Daily      furosemide 40 MG tablet  Commonly known as: LASIX   40 mg, Oral, Daily      INSULIN LISPRO SC   20 Units, Subcutaneous, 3 Times Daily      losartan 50 MG tablet  Commonly known as: COZAAR   50 mg, Oral, Daily      metoprolol succinate  MG 24 hr tablet  Commonly known as: TOPROL-XL   100 mg, Oral, Daily      multivitamin with minerals tablet tablet   1 tablet, Oral, Daily      ONE TOUCH ULTRA 2 w/Device  kit   USE TO TEST BLOOD SUGAR TWICE DAILY      ONE TOUCH ULTRA TEST test strip  Generic drug: glucose blood   USE TO TEST BLOOD SUGAR TWICE DAILY      ticagrelor 90 MG tablet tablet  Commonly known as: BRILINTA   90 mg, Oral, 2 Times Daily      VASCEPA PO   1 g, Oral, 2 times daily, 2 capsules bid       VITAMIN D3 PO   4,000 Units, Oral, Daily      Xarelto 20 MG tablet  Generic drug: rivaroxaban   20 mg, Oral, Daily      ZINC PO   Oral, Daily, Unknown dosage          Stop These Medications    glipizide 10 MG tablet  Commonly known as: GLUCOTROL     metOLazone 5 MG tablet  Commonly known as: ZAROXOLYN            INSTRUCTIONS:  Activity:   Activity Instructions     Activity as Tolerated          Diet:   Diet Instructions     Advance Diet As Tolerated            Special instructions: Patient instructed to call MD or return to ED with worsening shortness of breath, chest pain, fever greater than 100.4 degrees F or any other medical concerns..    FOLLOW UP:   Follow-up Information     Dana Squires MD. Schedule an appointment as soon as possible for a visit in 1 week(s).    Specialties: Cardiology, Interventional Cardiology  Why: Hospital follow up.  Contact information:  [address] [facility]  [address]  [place] [address] [phone]             Augustin Fletcher MD. Schedule an appointment as soon as possible for a visit in 1 week(s).    Specialty: Nephrology  Why: Hospital follow up. Needs BMP. Metolazone on hold on discharge.  Contact information:  [address] [facility] [address] [phone]             Elvis Jones MD. Schedule an appointment as soon as possible for a visit in 1 week(s).    Specialty: Family Medicine  Contact information:  [address] [facility] [address] [phone]                   PENDING TEST RESULTS AT DISCHARGE      Time: Discharge 30 min          This document has been electronically signed by Gavin Moreau MD on February 20, 2021 10:29 CST

## 2021-02-22 NOTE — PAYOR COMM NOTE
"Janet Guo   [facility]  phone [phone]  fax [phone]    REF# [id number]    Kiran Moss (70 y.o. Male)     Date of Birth Social Security Number Address Home Phone MRN    [id number]  [id number] [facility] [address] [phone] [id number]    Church Marital Status          Anglican        Admission Date Admission Type Admitting Provider Attending Provider Department, Room/Bed    2/19/21 Emergency Jacquelyn Garcia MD  [address], 327/1    Discharge Date Discharge Disposition Discharge Destination        2/20/2021 Home or Self Care              Attending Provider: (none)   Allergies: Evolocumab, Lipitor [Atorvastatin], Lisinopril    Isolation: None   Infection: None   Code Status: Prior    Ht: 172.7 cm (68\")   Wt: 89.3 kg (196 lb 12.8 oz)    Admission Cmt: None   Principal Problem: None                Active Insurance as of 2/19/2021     Primary Coverage     Payor Plan Insurance Group Employer/Plan Group    HEALTHSCOPE BENEFITS HEALTHSCOPE BENEFITS [id number]     Payor Plan Address Payor Plan Phone Number Payor Plan Fax Number Effective Dates    [address] [address] [phone]  1/1/2018 - None Entered    [facility] [id number]       Subscriber Name Subscriber Birth Date Member ID       KIRAN MOSS 1950 [id number]           Secondary Coverage     Payor Plan Insurance Group Employer/Plan Group    MEDICARE MEDICARE A & B      Payor Plan Address Payor Plan Phone Number Payor Plan Fax Number Effective Dates    [address] [phone]  7/1/2015 - None Entered    formerly [facility] [id number]       Subscriber Name Subscriber Birth Date Member ID       KIRAN MOSS 1950 [id number]                 Emergency Contacts      (Rel.) Home Phone Work Phone Mobile Phone    AjayYanet (Spouse) [phone] -- [phone]               History & Physical      Jacquelyn Garcia MD at 02/19/21 [facility]9                [facility] " [facility] Admission      Date of Admission: 2/19/2021      Primary Care Physician: Elvis Jones MD      Chief Complaint: chest pain     HPI:    Mr. Moss is a 70 year old man who presented with chest pain that started this morning. He has a history of CAD s/p stents, a fib on anticoagulation, s/p aortic valve replacement, CHF, HLD, HTN, DM with recently admission to the hospital for acute heart failure. He had chest pain with ambulation this morning when he was going to work. He had some associated shortness of breath. The pain radiated to his neck. He denies any fevers or chills.     In the ED, trop was negative x2. BNP 1193. Creatinine was noted to be 2.87 which is higher than his baseline. He was recently started on metolazone after his recent heart failure exacerbation.       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    Past Surgical History:  has a past surgical history that includes Angioplasty (07/16/2000); Cardiac catheterization (12/10/2014);  Esophagogastroduodenoscopy (10/23/2007); Excision Lesion (07/31/2015); Excision Lesion (07/31/2015); Inguinal Hernia Repair (11/05/2007); Knee surgery (08/06/1985); Aortic valve replacement (12/16/2014); Vasectomy (03/24/1996); Cardiac surgery; Colonoscopy (N/A, 11/20/2017); Cholecystectomy; and Hernia repair.    Family History: family history includes Arthritis in his father and mother; Cancer in his father; Diabetes in his father, mother, and another family member; Hyperlipidemia in his father; Hypertension in his father and mother; Nephrolithiasis in an other family member.     Social History:  reports that he has quit smoking. He has never used smokeless tobacco. He reports current alcohol use. He reports that he does not use drugs.    Allergies:   Allergies   Allergen Reactions   • Evolocumab Other (See Comments)     itching   • Lipitor [Atorvastatin] Myalgia   • Lisinopril Cough       Medications:   Prior to Admission medications    Medication Sig Start Date End Date Taking? Authorizing Provider   Cholecalciferol (VITAMIN D3 PO) Take 4,000 Units by mouth Daily.   Yes Magdy Rosen MD   dilTIAZem CD (CARDIZEM CD) 120 MG 24 hr capsule Take 120 mg by mouth Daily.   Yes Magdy Rosen MD   fenofibrate 160 MG tablet TAKE 1 TABLET BY MOUTH DAILY 2/11/19  Yes Ashvin Peterson MD   furosemide (LASIX) 40 MG tablet Take 1 tablet by mouth Daily for 5 days. 9/10/20 2/19/21 Yes Uzair Steele MD   glipiZIDE (GLUCOTROL) 10 MG tablet Take 1 tablet by mouth 2 (Two) Times a Day. 5/10/18  Yes Ashvin Peterson MD   Icosapent Ethyl (VASCEPA PO) Take 1 g by mouth 2 (two) times a day. 2 capsules bid   Yes Magdy Rosen MD   insulin glargine (LANTUS) 100 UNIT/ML injection Inject 38 Units under the skin Daily.  Patient taking differently: Inject 25 Units under the skin into the appropriate area as directed 2 (Two) Times a Day. 5/16/18  Yes Ashvin Peterson MD   INSULIN LISPRO SC Inject 20 Units under the  skin into the appropriate area as directed 3 (Three) Times a Day.   Yes Magdy Rosen MD   losartan (COZAAR) 50 MG tablet TAKE 1 TABLET BY MOUTH DAILY 2/11/19  Yes Ashvin Peterson MD   metOLazone (ZAROXOLYN) 5 MG tablet Take 5 mg by mouth Daily.   Yes Magdy Rosen MD   metoprolol succinate XL (TOPROL-XL) 100 MG 24 hr tablet Take 100 mg by mouth Daily. 8/25/20  Yes Magdy Rosen MD   Multiple Vitamins-Minerals (ZINC PO) Take  by mouth Daily. Unknown dosage   Yes Magdy Rosen MD   multivitamin with minerals (CENTRUM ULTRA MENS PO) Take 1 tablet by mouth Daily.   Yes Magdy Rosen MD   rivaroxaban (Xarelto) 20 MG tablet Take 20 mg by mouth Daily. 11/19/19  Yes Magdy Rosen MD   ticagrelor (BRILINTA) 90 MG tablet tablet Take 90 mg by mouth 2 (Two) Times a Day.   Yes Magdy Rosen MD   Blood Glucose Monitoring Suppl (ONE TOUCH ULTRA 2) w/Device kit USE TO TEST BLOOD SUGAR TWICE DAILY 8/13/18   Ashvin Peterson MD   ONE TOUCH ULTRA TEST test strip USE TO TEST BLOOD SUGAR TWICE DAILY 12/6/18   Ashvin Peterson MD   amiodarone (PACERONE) 200 MG tablet Take 200 mg by mouth Daily. 9/2/20 2/19/21  Magdy Rosen MD   aspirin 81 MG EC tablet Take 81 mg by mouth Daily.  2/19/21  Magdy Rosen MD   fluticasone (FLONASE) 50 MCG/ACT nasal spray 2 sprays into each nostril daily. Med Name: fluticasone 50 mcg/actuation nasal spray,suspension  2/19/21  Magdy Rosen MD   LANTUS 100 UNIT/ML injection ADMINISTER 30 UNITS UNDER THE SKIN DAILY 8/13/18 2/19/21  Ashvin Peterson MD   Multiple Vitamins-Iron (DAILY MULTI-VITAMINS/IRON PO) Take 1 tablet by mouth daily.  2/19/21  Magdy Rosen MD       Review of Systems:  Review of Systems   Constitutional: Negative for chills, diaphoresis and fever.   HENT: Negative for sneezing and sore throat.    Eyes: Negative for pain and discharge.   Respiratory: Positive for shortness of breath. Negative for  cough.    Cardiovascular: Positive for chest pain.   Gastrointestinal: Negative for constipation, diarrhea, nausea and vomiting.   Endocrine: Negative for cold intolerance and heat intolerance.   Genitourinary: Negative for difficulty urinating, dysuria, frequency and urgency.   Musculoskeletal: Negative for arthralgias and myalgias.   Skin: Negative for color change and pallor.   Allergic/Immunologic: Negative for environmental allergies and food allergies.   Neurological: Negative for dizziness, syncope and weakness.   Psychiatric/Behavioral: Negative for confusion and sleep disturbance.      Otherwise complete ROS is negative except as mentioned above.    Physical Exam:   Temp:  [96 °F (35.6 °C)-97.6 °F (36.4 °C)] 97.6 °F (36.4 °C)  Heart Rate:  [60-78] 63  Resp:  [18-22] 18  BP: (114-135)/(45-79) 121/58  Physical Exam  Vitals signs reviewed.   Constitutional:       General: He is not in acute distress.     Appearance: He is well-developed.   HENT:      Head: Normocephalic and atraumatic.      Nose: Nose normal.   Eyes:      Conjunctiva/sclera: Conjunctivae normal.   Neck:      Musculoskeletal: Normal range of motion and neck supple.   Cardiovascular:      Rate and Rhythm: Normal rate and regular rhythm.   Pulmonary:      Effort: Pulmonary effort is normal. No respiratory distress.      Breath sounds: Normal breath sounds. No wheezing or rales.   Musculoskeletal: Normal range of motion.   Skin:     General: Skin is warm and dry.   Neurological:      Mental Status: He is alert and oriented to person, place, and time.   Psychiatric:         Behavior: Behavior normal.           Results Reviewed:  I have personally reviewed current lab, radiology, and data and agree with results.  Lab Results (last 24 hours)     Procedure Component Value Units Date/Time    Troponin [[id number]]  (Normal) Collected: 02/19/21 1200    Specimen: Blood Updated: 02/19/21 1232     Troponin T 0.013 ng/mL     Narrative:      Troponin T  Reference Range:  <= 0.03 ng/mL-   Negative for AMI  >0.03 ng/mL-     Abnormal for myocardial necrosis.  Clinicians would have to utilize clinical acumen, EKG, Troponin and serial changes to determine if it is an Acute Myocardial Infarction or myocardial injury due to an underlying chronic condition.       Results may be falsely decreased if patient taking Biotin.      POC Glucose Once [[id number]]  (Abnormal) Collected: 02/19/21 1026    Specimen: Blood Updated: 02/19/21 1221     Glucose 203 mg/dL      Comment: RN NotifiedOperator: [id number] XIMENA Candelaria ID: [id number]       Troponin [[id number]]  (Normal) Collected: 02/19/21 0929    Specimen: Blood Updated: 02/19/21 1009     Troponin T 0.012 ng/mL     Narrative:      Troponin T Reference Range:  <= 0.03 ng/mL-   Negative for AMI  >0.03 ng/mL-     Abnormal for myocardial necrosis.  Clinicians would have to utilize clinical acumen, EKG, Troponin and serial changes to determine if it is an Acute Myocardial Infarction or myocardial injury due to an underlying chronic condition.       Results may be falsely decreased if patient taking Biotin.      Extra Tubes [[id number]] Collected: 02/19/21 0732    Specimen: Blood, Venous Line Updated: 02/19/21 0845    Narrative:      The following orders were created for panel order Extra Tubes.  Procedure                               Abnormality         Status                     ---------                               -----------         ------                     Light Blue Top[[id number]]                                   Final result               Gold Top - SST[[id number]]                                   Final result                 Please view results for these tests on the individual orders.    Light Blue Top [[id number]] Collected: 02/19/21 0732    Specimen: Blood Updated: 02/19/21 0845     Extra Tube hold for add-on     Comment: Auto resulted       Gold Top - SST [[id number]] Collected: 02/19/21 0732    Specimen: Blood  Updated: 02/19/21 0845     Extra Tube Hold for add-ons.     Comment: Auto resulted.       COVID-19 and FLU A/B PCR - Swab, Nasopharynx [[id number]]  (Normal) Collected: 02/19/21 0746    Specimen: Swab from Nasopharynx Updated: 02/19/21 0812     COVID19 Not Detected     Influenza A PCR Not Detected     Influenza B PCR Not Detected    Narrative:      Fact sheet for providers: https://www.fda.gov/media/556825/download    Fact sheet for patients: https://www.fda.gov/media/952684/download    Test performed by PCR.    Comprehensive Metabolic Panel [[id number]]  (Abnormal) Collected: 02/19/21 0727    Specimen: Blood from Arm, Right Updated: 02/19/21 0800     Glucose 203 mg/dL      BUN 52 mg/dL      Creatinine 2.87 mg/dL      Sodium 140 mmol/L      Potassium 3.7 mmol/L      Chloride 100 mmol/L      CO2 29.0 mmol/L      Calcium 10.5 mg/dL      Total Protein 7.5 g/dL      Albumin 4.40 g/dL      ALT (SGPT) 13 U/L      AST (SGOT) 18 U/L      Alkaline Phosphatase 47 U/L      Total Bilirubin 0.3 mg/dL      eGFR Non African Amer 22 mL/min/1.73      Globulin 3.1 gm/dL      A/G Ratio 1.4 g/dL      BUN/Creatinine Ratio 18.1     Anion Gap 11.0 mmol/L     Narrative:      GFR Normal >60  Chronic Kidney Disease <60  Kidney Failure <15      Troponin [[id number]]  (Normal) Collected: 02/19/21 0727    Specimen: Blood from Arm, Right Updated: 02/19/21 0800     Troponin T 0.015 ng/mL     Narrative:      Troponin T Reference Range:  <= 0.03 ng/mL-   Negative for AMI  >0.03 ng/mL-     Abnormal for myocardial necrosis.  Clinicians would have to utilize clinical acumen, EKG, Troponin and serial changes to determine if it is an Acute Myocardial Infarction or myocardial injury due to an underlying chronic condition.       Results may be falsely decreased if patient taking Biotin.      Magnesium [[id number]]  (Normal) Collected: 02/19/21 0727    Specimen: Blood from Arm, Right Updated: 02/19/21 0800     Magnesium 2.2 mg/dL     BNP [[id number]]   (Abnormal) Collected: 02/19/21 0727    Specimen: Blood from Arm, Right Updated: 02/19/21 0758     proBNP 1,193.0 pg/mL     Narrative:      Among patients with dyspnea, NT-proBNP is highly sensitive for the detection of acute congestive heart failure. In addition NT-proBNP of <300 pg/ml effectively rules out acute congestive heart failure with 99% negative predictive value.    Results may be falsely decreased if patient taking Biotin.      Urinalysis With Microscopic If Indicated (No Culture) - Urine, Clean Catch [[id number]]  (Normal) Collected: 02/19/21 0746    Specimen: Urine, Clean Catch Updated: 02/19/21 0754     Color, UA Yellow     Appearance, UA Clear     pH, UA 6.0     Specific Gravity, UA 1.008     Glucose, UA Negative     Ketones, UA Negative     Bilirubin, UA Negative     Blood, UA Negative     Protein, UA Negative     Leuk Esterase, UA Negative     Nitrite, UA Negative     Urobilinogen, UA 0.2 E.U./dL    Narrative:      Urine microscopic not indicated.    CBC & Differential [[id number]]  (Abnormal) Collected: 02/19/21 0727    Specimen: Blood Updated: 02/19/21 0740    Narrative:      The following orders were created for panel order CBC & Differential.  Procedure                               Abnormality         Status                     ---------                               -----------         ------                     CBC Auto Differential[[id number]]        Abnormal            Final result                 Please view results for these tests on the individual orders.    CBC Auto Differential [[id number]]  (Abnormal) Collected: 02/19/21 0727    Specimen: Blood Updated: 02/19/21 0740     WBC 8.03 10*3/mm3      RBC 3.25 10*6/mm3      Hemoglobin 8.2 g/dL      Hematocrit 26.8 %      MCV 82.5 fL      MCH 25.2 pg      MCHC 30.6 g/dL      RDW 15.1 %      RDW-SD 45.3 fl      MPV 10.0 fL      Platelets 332 10*3/mm3      Neutrophil % 59.2 %      Lymphocyte % 20.7 %      Monocyte % 13.1 %      Eosinophil % 6.0 %       Basophil % 0.6 %      Immature Grans % 0.4 %      Neutrophils, Absolute 4.76 10*3/mm3      Lymphocytes, Absolute 1.66 10*3/mm3      Monocytes, Absolute 1.05 10*3/mm3      Eosinophils, Absolute 0.48 10*3/mm3      Basophils, Absolute 0.05 10*3/mm3      Immature Grans, Absolute 0.03 10*3/mm3      nRBC 0.0 /100 WBC         Imaging Results (Last 24 Hours)     Procedure Component Value Units Date/Time    XR Chest 1 View [[id number]] Collected: 02/19/21 0727     Updated: 02/19/21 0747    Narrative:      Chest x-ray single view.           CLINICAL INDICATION: Chest pain.        COMPARISON: Chest September 10, 2020     FINDINGS: Cardiac silhouette is normal in size. Pulmonary  vascularity is unremarkable.   Midline sternal sutures from prior cardiac surgery. Pacemaker  leads right atrium and right ventricle.    Lung fields are clear.  No focal infiltrate or consolidation.  No pleural effusion.  No  pneumothorax.      Impression:      No evidence of active disease.    Electronically signed by:  Giacomo Tarango MD  2/19/2021 7:45 AM CST  Workstation: [id number]            Assessment:    Active Hospital Problems    Diagnosis   • Chest pain, rule out acute myocardial infarction   • Stage 3 chronic kidney disease (CMS/HCC)   • Atrial fibrillation (CMS/HCC)     Atrial fibrillation - RSR post MAZE     • Type 2 diabetes mellitus with microalbuminuria (CMS/HCC)   • Bicuspid aortic valve     Bicuspid aortic valve - AVR replacement 12/16/14     • Coronary atherosclerosis of native coronary artery     Coronary atherosclerosis of native coronary artery - S/P 3.0 x 23mm Xience V stent to mid RCA 12/11 (NSTEMI     • Benign non-nodular prostatic hyperplasia with lower urinary tract symptoms     Post TUR 09/09/2016     • Hyperlipidemia             Plan:    1. Chest pain  -given his history, cardiology was consulted. Dr. Ibarra saw the patient. He recommended overnight observation, starting IMDUR and follow up with his cardiologist in  [place].  -trop trended and negative  -MICHELLE  -continue Brilinta   -continue Toprol, Losartan  -obtain echo    2. A Fib  -cardiac monitoring  -continue BB, CCB  -Xarelto    3. DM  -levemir 20mg daily  -SSI     4. CARO on CKD  -will stop metolazone and monitor renal function. Could be secondary to the addition of new diuretic.      DVT prop: Xarelto  Dispo: home tomorrow if renal function is better and cardiology has signed off        This document has been electronically signed by Jacquelyn Garcia MD on February 19, 2021 14:33 CST                  Electronically signed by Jacquelyn Garcia MD at 02/19/21 1434          Emergency Department Notes      Guillaume Maurer MD at 02/19/21 0794      Procedure Orders    1. ECG 12 Lead [[id number]] ordered by Guillaume Maurer MD               Subjective   70 years old male with history of coronary artery disease status post stenting, congestive heart failure, atrial fibrillation on Xarelto/pacemaker placement, aortic valve replacement, hypertension, hyperlipidemia, diabetes mellitus presented in the ER with chief complaint of moderate substernal chest tightness and pressure sudden onset this morning almost an hour ago when he was getting ready to go work with associated shortness of breath.  Patient reports having shortness of breath at his baseline which is not much worse than usual but his chest tightness pressure is radiating to left arm without any significant aggravating or relieving factors.  Patient denies any fever chills or sick contact.  Patient was recently admitted at [facility] with congestive heart failure and has been started on metolazone recently.      History provided by:  Patient      Review of Systems   Constitutional: Negative for chills.   HENT: Negative for congestion, sinus pressure, sinus pain, sneezing and sore throat.    Respiratory: Positive for cough, chest tightness and shortness of breath.    Cardiovascular: Positive for chest pain and leg  swelling.   Gastrointestinal: Negative for abdominal pain, nausea and vomiting.   Genitourinary: Negative for flank pain.   Musculoskeletal: Negative for joint swelling.   Neurological: Negative for syncope and headaches.   Psychiatric/Behavioral: Negative for agitation.       Past Medical History:   Diagnosis Date   • Actinic keratosis    • Acute bronchitis    • Acute upper respiratory infection     Acute upper respiratory infection, unspecified   • Aortic valve stenosis     Aortic valve stenosis - post AVR   • Asthma    • Atrial fibrillation (CMS/HCC)     Atrial fibrillation - RSR post MAZE   • Bicuspid aortic valve     Bicuspid aortic valve - AVR replacement 12/16/14   • Coronary arteriosclerosis    • Coronary atherosclerosis of native coronary artery     Coronary atherosclerosis of native coronary artery - S/P 3.0 x 23mm Xience V stent to mid RCA 12/11 (NSTEMI   • Cortical senile cataract    • Crushing injury     Crushing injury - left 4th finger   • Diabetes mellitus (CMS/[facility])     Diabetes mellitus - no retinopathy on today's exam   • 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   • History of echocardiogram 12/14/2011    Echocardiogram W/O color flow 68436 (1) - Normal LV systolic function with diastolic dysfunction. Biscupid aortic valve w/mild to moderate aortic stenosis and mild aortic insufficiency. Mild left atrial enlargement.   • Hyperlipidemia     Hyperlipidemia - cannot tolerate higher dose than 20 mg lipitor   • Hypermetropia    • Inguinal hernia,  recurrent     Inguinal hernia - recurrent   • Microalbuminuria    • Need for immunization against influenza     Needs influenza immunization   • Postoperative pain     Postoperative pain - 12/16/14 AVR Medtronic concave Aortic 25 mm Mosaic [id number] model [id number] serial [id number] MAZE Resolved   • Postoperative visit     Postoperative visit - 12/16/14 AVR Medtronic concave Aortic 25 mm Mosaic [id number] model [id number] serial [id number] MAZE   • Scalding pain on urination    • Type 2 diabetes mellitus (CMS/HCC)        Allergies   Allergen Reactions   • Evolocumab Other (See Comments)     itching   • Lipitor [Atorvastatin] Myalgia   • Lisinopril Cough       Past Surgical History:   Procedure Laterality Date   • ANGIOPLASTY  07/16/2000    Angioplasty, coronary (1) - wide open patency to anterior descending artery at the inflation sites. No intimal luminal irregularities, tears, or residual stenosis found   • AORTIC VALVE REPAIR/REPLACEMENT  12/16/2014    Replacement of aortic valve (1) - 12/16/14 AVR Medtronic concave Aortic 25 mm Mosaic [id number] model [id number] serial [id number] MAZE   • CARDIAC CATHETERIZATION  12/10/2014    Cardiac cath 69757 (3) - Patent right coronary artery stent. MInimal nonobstructive disease in the LAD and left circumflex.   • CARDIAC SURGERY      valve   • COLONOSCOPY N/A 11/20/2017    Procedure: COLONOSCOPY;  Surgeon: Rony Pena MD;  Location: [facility] ENDOSCOPY;  Service:    • ENDOSCOPY  10/23/2007    Colon endoscopy 27503 (1) - remarkably poor [rep potentially obscures any small polyps that might be present. Colonic mucosa appeared entirely NRL. There were no masses or polyps found   • EXCISION LESION  07/31/2015    Destruction of Premalignant Lesion (1st) 25883 (1) - CYNTHIA POWELL (FP)   • EXCISION LESION  07/31/2015    Destruction of Premalignant Lesions (2-14) 85337 (1) - CYNTHIA POWELL (FP)   • INGUINAL HERNIA REPAIR  11/05/2007    Inguinal hernia, repair (1) - Sutured right inguinal hernia repair.  Direct inguinal hernia, recurring in previous mesh repair   • KNEE SURGERY  08/06/1985    Knee Surgery (1) - Debridement, arthroscopy, abrasive chondoplasty. Degenerative chondromalacia, medial femoral condyle, left knee   • VASECTOMY  03/24/1996    Vasectomy - ligate (1) - elective       Family History   Problem Relation Age of Onset   • Diabetes Other    • Nephrolithiasis Other    • Arthritis Mother    • Diabetes Mother    • Hypertension Mother    • Arthritis Father    • Cancer Father         skin   • Diabetes Father    • Hyperlipidemia Father    • Hypertension Father    • Coronary artery disease Neg Hx        Social History     Socioeconomic History   • Marital status:      Spouse name: Not on file   • Number of children: Not on file   • Years of education: Not on file   • Highest education level: Not on file   Tobacco Use   • Smoking status: Former Smoker   • Smokeless tobacco: Never Used   Substance and Sexual Activity   • Alcohol use: Yes     Comment: 1 beer daily   • Drug use: No           Objective   Physical Exam  Vitals signs and nursing note reviewed.   Constitutional:       Appearance: He is well-developed.   HENT:      Head: Normocephalic and atraumatic.   Eyes:      Extraocular Movements: Extraocular movements intact.      Pupils: Pupils are equal, round, and reactive to light.   Neck:      Musculoskeletal: Normal range of motion and neck supple.   Cardiovascular:      Rate and Rhythm: Normal rate and regular rhythm.      Heart sounds: Normal heart sounds.   Pulmonary:      Effort: Pulmonary effort is normal. No tachypnea or accessory muscle usage.      Breath sounds: Rhonchi present.   Abdominal:      General: Bowel sounds are normal.      Palpations: Abdomen is soft.   Musculoskeletal:      Right lower leg: Edema present.      Left lower leg: Edema present.   Skin:     Capillary Refill: Capillary refill takes less than 2 seconds.   Neurological:      General: No focal deficit present.       Mental Status: He is alert and oriented to person, place, and time.   Psychiatric:         Mood and Affect: Mood normal.         ECG 12 Lead      Date/Time: 2/19/2021 7:27 AM  Performed by: Guillaume Maurer MD  Authorized by: Guillaume Maurer MD   Interpreted by physician  Rhythm: paced  Rate: normal  BPM: 68  QRS axis: left  Conduction: right bundle branch block  Clinical impression: abnormal ECG                ED Course                                           MDM  Number of Diagnoses or Management Options  Chest pain, rule out acute myocardial infarction:   Diagnosis management comments: 70 years old is evaluated for substernal chest tightness and pressure with minimal shortness of breath.  EKG showed paced rhythm and has negative initial troponin.  Patient is on Xarelto/Brilinta.  X-ray is negative for any acute cardiopulmonary findings but patient does have lower extremity increasing swelling, probably going to her CHF exacerbation.  Patient has taken 80 mg Lasix and metolazone just prior to arrival, will not load him up with another dose of IV Lasix.  Given symptomatic treatment for pain and chest tightness, on reevaluation feeling better.  Discussed with Dr. Garcia and patient is admitted for observation.       Amount and/or Complexity of Data Reviewed  Clinical lab tests: ordered and reviewed  Tests in the radiology section of CPT®: ordered and reviewed  Discuss the patient with other providers: yes      Labs Reviewed   COMPREHENSIVE METABOLIC PANEL - Abnormal; Notable for the following components:       Result Value    Glucose 203 (*)     BUN 52 (*)     Creatinine 2.87 (*)     eGFR Non  Amer 22 (*)     All other components within normal limits    Narrative:     GFR Normal >60  Chronic Kidney Disease <60  Kidney Failure <15     BNP (IN-HOUSE) - Abnormal; Notable for the following components:    proBNP 1,193.0 (*)     All other components within normal limits    Narrative:     Among patients with dyspnea,  NT-proBNP is highly sensitive for the detection of acute congestive heart failure. In addition NT-proBNP of <300 pg/ml effectively rules out acute congestive heart failure with 99% negative predictive value.    Results may be falsely decreased if patient taking Biotin.     CBC WITH AUTO DIFFERENTIAL - Abnormal; Notable for the following components:    RBC 3.25 (*)     Hemoglobin 8.2 (*)     Hematocrit 26.8 (*)     MCH 25.2 (*)     MCHC 30.6 (*)     Monocyte % 13.1 (*)     Monocytes, Absolute 1.05 (*)     Eosinophils, Absolute 0.48 (*)     All other components within normal limits   COVID-19 AND FLU A/B, NP SWAB IN TRANSPORT MEDIA 8-12 HR TAT - Normal    Narrative:     Fact sheet for providers: https://www.fda.gov/media/609691/download    Fact sheet for patients: https://www.fda.gov/media/072902/download    Test performed by PCR.   URINALYSIS W/ MICROSCOPIC IF INDICATED (NO CULTURE) - Normal    Narrative:     Urine microscopic not indicated.   TROPONIN (IN-HOUSE) - Normal    Narrative:     Troponin T Reference Range:  <= 0.03 ng/mL-   Negative for AMI  >0.03 ng/mL-     Abnormal for myocardial necrosis.  Clinicians would have to utilize clinical acumen, EKG, Troponin and serial changes to determine if it is an Acute Myocardial Infarction or myocardial injury due to an underlying chronic condition.       Results may be falsely decreased if patient taking Biotin.     MAGNESIUM - Normal   TROPONIN (IN-HOUSE)   CBC AND DIFFERENTIAL    Narrative:     The following orders were created for panel order CBC & Differential.  Procedure                               Abnormality         Status                     ---------                               -----------         ------                     CBC Auto Differential[[id number]]        Abnormal            Final result                 Please view results for these tests on the individual orders.   EXTRA TUBES    Narrative:     The following orders were created for panel order  Extra Tubes.  Procedure                               Abnormality         Status                     ---------                               -----------         ------                     Light Blue Top[[id number]]                                   In process                 Gold Top - [facility] [[email or web address]][[id number]]                                   In process                   Please view results for these tests on the individual orders.   LIGHT BLUE TOP   GOLD TOP - SST       Xr Chest 1 View    Result Date: 2/19/2021  Narrative: Chest x-ray single view. CLINICAL INDICATION: Chest pain. COMPARISON: Chest September 10, 2020 FINDINGS: Cardiac silhouette is normal in size. Pulmonary vascularity is unremarkable. Midline sternal sutures from prior cardiac surgery. Pacemaker leads right atrium and right ventricle. Lung fields are clear. No focal infiltrate or consolidation.  No pleural effusion.  No pneumothorax.     Impression: No evidence of active disease. Electronically signed by:  Giacomo Tarango MD  2/19/2021 7:45 AM CST Workstation: [id number]        Final diagnoses:   Chest pain, rule out acute myocardial infarction            Guillaume Maurer MD  02/19/21 0843      Electronically signed by Guillaume Maurer MD at 02/19/21 0843     Alice Del Cid RN at 02/19/21 0736        Respiratory called for Alice Fuentes RN  02/19/21 0736      Electronically signed by Alice Del Cid RN at 02/19/21 0736     Alice Del Cid RN at 02/19/21 0844        Report called to KRYS Syed on [facility]     Alice Del Cid RN  02/19/21 0844      Electronically signed by Alice Del Cid RN at 02/19/21 0844       Physician Progress Notes (last 72 hours) (Notes from 02/19/21 0936 through 02/22/21 0936)    No notes of this type exist for this encounter.            Consult Notes (last 72 hours) (Notes from 02/19/21 0936 through 02/22/21 0936)      Lino Ibarra MD at 02/19/21 1219      Consult Orders    1. Inpatient Cardiology Consult  [[id number]] ordered by Jacquelyn Garcia MD at 02/19/21 0909                Cardiology at [facility]  Cardiovascular Consultation Note      Kiran Moss  327/1  [id number]  1950    DATE OF ADMISSION: 2/19/2021  DATE OF CONSULTATION:  2/19/2021    Elvis Jones MD  Treatment Team:   Attending Provider: Jacquelyn Garcia MD  Consulting Physician: Jacquelyn Garcia MD  Consulting Physician: Lino Ibarra MD  Admitting Provider: Jacquelyn Garcia MD    Chief Complaint   Patient presents with   • Chest Pain   • Shortness of Breath       Chief complaint/ Reason for Consultation chest pain and shortness of breath      History of Present Illness  Body mass index is 29.8 kg/m². BMI is above normal parameters. Recommendations include: exercise counseling, nutrition counseling and referral to primary care.    70 years old patient known to Dr. Squires presented for evaluation of chest pain described as sharp to pressure-like feeling with associated shortness of breath for further evaluation to [facility] patient given aspirin and bronchodilators with a significant provement in shortness of breath or chest pain.  He has some radiation to the left upper extremity which was sharp in quality.  No nauseous or vomiting reported.  No palpitation fluttering.  He is a pain-free at the time of evaluation.  EKG paced rhythm and cardiac enzymes are negative.  His hemoglobin is slightly greater than 8.0 he has a history of chronic anemia.  His BNP is not significantly elevated clinically is not in heart failure.  No syncope or near syncopal episode reported.  The patient has background history of CAD s/p PCI, chronic renal insufficiency, history of chronic anemia, bioprosthetic aortic valve, history of atrial fibrillation/atrial flutter status post EP study and ablations, hypertension with hypertensive heart disease, hyperlipidemia, diabetes          Ref Range & Units  09:29 07:27 5mo ago   Troponin T   0.000 - 0.030 ng/mL 0.012  0.015  <0.010          Ref Range & Units 07:27 5mo ago   proBNP   0.0 - 900.0 pg/mL 1,193.0High   3,028.0High     Resulting Agency  [facility]     RBC   4.14 - 5.80 10*6/mm3 3.25Low    4.64    Hemoglobin   13.0 - 17.7 g/dL 8.2Low   9.9Low  R  12.2Low     Hematocrit   37.5 - 51.0 % 26.8Low   31.4Low  R  40.7 CM    MCV   79.0 - 97.0 fL 82.5   87.7    MCH   26.6 - 33.0 pg 25.2Low    26.3Low     MCHC   31.5 - 35.7 g/dL 30.6Low           9/16/2020  Successful atherectomy and PCI to the proximal LAD and proximal to mid left circumflex.  3.5 x 28 mm Xience Dolores drug-eluting stent was deployed and the LAD  3.0 x 33 mm Xience Dolores drug-eluting stent was deployed in the circumflex.     Transesophageal echo 12/8/2020 at [place]    Conclusions  The estimated ejection fraction is 50-55%.  The left atrium is moderately dilated.  There is no thrombus visualized in the left atrial appendage.   A pacemaker wire is visualized in the right ventricle.  The right atrium is moderately dilated.   A pacemaker wire is visualized in the right atrium.  A bileaflet mechanical aortic valve is present.   The bio-prosthetic aortic valve appears to be functioning normally.   There is a trace of mitral regurgitation.  There is trace tricuspid regurgitation present.  There is no pericardial effusion.      Past medical/surgical history:  • Allergic state     • Arthritis     • Cardiac arrhythmia       Atrial fibrillation   • Cardiac pacemaker in situ     • Cataract     • Congestive heart failure     • Coronary artery disease     • Cough       allergies   • Diabetes mellitus     • Hypercholesterolemia     • Hypertension       has been on b/p meds for greater than 10 years   • Kidney stones     • Myocardial infarction     • Renal insufficiency       stage 3   • Type 2 diabetes mellitus       Past Medical History:   Diagnosis Date   • Actinic keratosis    • Acute bronchitis     • Acute upper respiratory infection     Acute upper respiratory infection, unspecified   • Aortic valve stenosis     Aortic valve stenosis - post AVR   • Arthritis    • Atrial fibrillation (CMS/HCC)     Atrial fibrillation - RSR post MAZE   • Bicuspid aortic valve     Bicuspid aortic valve - AVR replacement 12/16/14   • CHF (congestive heart failure) (CMS/HCC)    • Coronary arteriosclerosis    • Coronary atherosclerosis of native coronary artery     Coronary atherosclerosis of native coronary artery - S/P 3.0 x 23mm Xience V stent to mid RCA 12/11 (NSTEMI   • Cortical senile cataract    • Crushing injury     Crushing injury - left 4th finger   • Diabetes mellitus (CMS/HCC)     Diabetes mellitus - no retinopathy on today's exam   • 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   • History of echocardiogram 12/14/2011    Echocardiogram W/O color flow 79131 (1) - Normal LV systolic function with diastolic dysfunction. Biscupid aortic valve w/mild to moderate aortic stenosis and mild aortic insufficiency. Mild left atrial enlargement.   • Hyperlipidemia     Hyperlipidemia - cannot tolerate higher dose than 20 mg lipitor   • Hypermetropia    • Inguinal hernia, recurrent     Inguinal hernia - recurrent   • Microalbuminuria    • Need for immunization against influenza     Needs influenza immunization   • Postoperative pain     Postoperative pain - 12/16/14 AVR Medtronic concave Aortic 25 mm Mosaic [id number] model [id number] serial [id number] MAZE  Resolved   • Postoperative visit     Postoperative visit - 12/16/14 AVR Medtronic concave Aortic 25 mm Mosaic [id number] model [id number] serial [id number] MAZE   • Scalding pain on urination    • Type 2 diabetes mellitus (CMS/HCC)        Past Surgical History:   Procedure Laterality Date   • ANGIOPLASTY  07/16/2000    Angioplasty, coronary (1) - wide open patency to anterior descending artery at the inflation sites. No intimal luminal irregularities, tears, or residual stenosis found   • AORTIC VALVE REPAIR/REPLACEMENT  12/16/2014    Replacement of aortic valve (1) - 12/16/14 AVR Medtronic concave Aortic 25 mm Mosaic [id number] model [id number] serial [id number] MAZE   • CARDIAC CATHETERIZATION  12/10/2014    Cardiac cath 95616 (3) - Patent right coronary artery stent. MInimal nonobstructive disease in the LAD and left circumflex.   • CARDIAC SURGERY      valve   • CHOLECYSTECTOMY     • COLONOSCOPY N/A 11/20/2017    Procedure: COLONOSCOPY;  Surgeon: Rony Pena MD;  Location: [facility] ENDOSCOPY;  Service:    • ENDOSCOPY  10/23/2007    Colon endoscopy 49875 (1) - remarkably poor [rep potentially obscures any small polyps that might be present. Colonic mucosa appeared entirely NRL. There were no masses or polyps found   • EXCISION LESION  07/31/2015    Destruction of Premalignant Lesion (1st) 20207 (1) - CYNTHIA POWELL (FP)   • EXCISION LESION  07/31/2015    Destruction of Premalignant Lesions (2-14) 91706 (1) - CYNTHIA POWELL (FP)   • HERNIA REPAIR     • INGUINAL HERNIA REPAIR  11/05/2007    Inguinal hernia, repair (1) - Sutured right inguinal hernia repair. Direct inguinal hernia, recurring in previous mesh repair   • KNEE SURGERY  08/06/1985    Knee Surgery (1) - Debridement, arthroscopy, abrasive chondoplasty. Degenerative chondromalacia, medial femoral condyle, left knee   • VASECTOMY  03/24/1996    Vasectomy - ligate (1) - elective       Allergies   Allergen Reactions   • Evolocumab Other (See Comments)     itching   • Lipitor  [Atorvastatin] Myalgia   • Lisinopril Cough       No current facility-administered medications on file prior to encounter.      Current Outpatient Medications on File Prior to Encounter   Medication Sig Dispense Refill   • Cholecalciferol (VITAMIN D3 PO) Take 4,000 Units by mouth Daily.     • dilTIAZem CD (CARDIZEM CD) 120 MG 24 hr capsule Take 120 mg by mouth Daily.     • fenofibrate 160 MG tablet TAKE 1 TABLET BY MOUTH DAILY 90 tablet 1   • furosemide (LASIX) 40 MG tablet Take 1 tablet by mouth Daily for 5 days. 5 tablet 0   • glipiZIDE (GLUCOTROL) 10 MG tablet Take 1 tablet by mouth 2 (Two) Times a Day. 180 tablet 3   • Icosapent Ethyl (VASCEPA PO) Take 1 g by mouth 2 (two) times a day. 2 capsules bid     • insulin glargine (LANTUS) 100 UNIT/ML injection Inject 38 Units under the skin Daily. (Patient taking differently: Inject 25 Units under the skin into the appropriate area as directed 2 (Two) Times a Day.) 11 mL 5   • INSULIN LISPRO SC Inject 20 Units under the skin into the appropriate area as directed 3 (Three) Times a Day.     • losartan (COZAAR) 50 MG tablet TAKE 1 TABLET BY MOUTH DAILY 90 tablet 1   • metOLazone (ZAROXOLYN) 5 MG tablet Take 5 mg by mouth Daily.     • metoprolol succinate XL (TOPROL-XL) 100 MG 24 hr tablet Take 100 mg by mouth Daily.     • Multiple Vitamins-Minerals (ZINC PO) Take  by mouth Daily. Unknown dosage     • multivitamin with minerals (CENTRUM ULTRA MENS PO) Take 1 tablet by mouth Daily.     • rivaroxaban (Xarelto) 20 MG tablet Take 20 mg by mouth Daily.     • ticagrelor (BRILINTA) 90 MG tablet tablet Take 90 mg by mouth 2 (Two) Times a Day.     • amiodarone (PACERONE) 200 MG tablet Take 200 mg by mouth Daily.     • aspirin 81 MG EC tablet Take 81 mg by mouth Daily.     • Blood Glucose Monitoring Suppl (ONE TOUCH ULTRA 2) w/Device kit USE TO TEST BLOOD SUGAR TWICE DAILY 1 each 5   • fluticasone (FLONASE) 50 MCG/ACT nasal spray 2 sprays into each nostril daily. Med Name:  "fluticasone 50 mcg/actuation nasal spray,suspension     • LANTUS 100 UNIT/ML injection ADMINISTER 30 UNITS UNDER THE SKIN DAILY 10 mL 0   • Multiple Vitamins-Iron (DAILY MULTI-VITAMINS/IRON PO) Take 1 tablet by mouth daily.     • ONE TOUCH ULTRA TEST test strip USE TO TEST BLOOD SUGAR TWICE DAILY 100 each 0       Social History     Socioeconomic History   • Marital status:      Spouse name: Not on file   • Number of children: Not on file   • Years of education: Not on file   • Highest education level: Not on file   Tobacco Use   • Smoking status: Former Smoker   • Smokeless tobacco: Never Used   Substance and Sexual Activity   • Alcohol use: Yes     Comment: 1 beer daily   • Drug use: No   • Sexual activity: Defer     Comment:            REVIEW OF SYSTEMS:   ROS  Constitutional: Negative for chills.   HENT: Negative for congestion, sinus pressure, sinus pain, sneezing and sore throat.    Respiratory: Positive for cough, chest tightness and shortness of breath.    Cardiovascular: Positive for chest pain and leg swelling.   Gastrointestinal: Negative for abdominal pain, nausea and vomiting.   Genitourinary: Negative for flank pain.   Musculoskeletal: Negative for joint swelling.   Neurological: Negative for syncope and headaches.   Psychiatric/Behavioral: Negative for agitation.      Objective:     Vitals:    02/19/21 0831 02/19/21 0920 02/19/21 0955 02/19/21 1109   BP: 125/57 127/58  121/58   BP Location:  Left arm  Left arm   Patient Position:  Sitting  Lying   Pulse: 62 60 78 63   Resp:  18  18   Temp:  96 °F (35.6 °C)  97.6 °F (36.4 °C)   TempSrc:  Oral  Temporal   SpO2: 100% 100%  97%   Weight: 88.9 kg (196 lb)      Height: 172.7 cm (68\")        Body mass index is 29.8 kg/m².  Flowsheet Rows      First Filed Value   Admission Height  172.7 cm (68\") Documented at 02/19/2021 0831   Admission Weight  88.9 kg (196 lb) Documented at 02/19/2021 0831          Intake/Output Summary (Last 24 hours) at " 2/19/2021 1219  Last data filed at 2/19/2021 1109  Gross per 24 hour   Intake --   Output 400 ml   Net -400 ml         Physical Exam   Physical Exam  Constitutional:       Appearance: He is well-developed.   HENT:      Head: Normocephalic and atraumatic.  Conjunctive is pink oral mucosa moist no jugular distention    Neck:      Musculoskeletal: Normal range of motion and neck supple.   Cardiovascular:      Rate and Rhythm: Normal rate and regular rhythm.  No S3 pericardial rub positive systolic murmur at the base of the heart     Heart sounds: Normal heart sounds.   Pulmonary:      Effort: Pulmonary effort is normal. No tachypnea or accessory muscle usage.      Breath sounds: Rhonchi present.  No rales  Abdominal:      General: Bowel sounds are normal.      Palpations: Abdomen is soft.   Musculoskeletal:       Mild lower extremity edema t.   Skin:     Capillary Refill: Capillary refill takes less than 2 seconds.   Neurological:      General: No focal deficit present.      Mental Status: He is alert and oriented to person, place, and time.   Psychiatric:         Mood and Affect: Mood normal.   Radiology Review    XR Chest 1 View   Final Result   No evidence of active disease.      Electronically signed by:  Giacomo Tarango MD  2/19/2021 7:45 AM CST   Workstation: [id number]          Lab Results:  Lab Results (last 24 hours)     Procedure Component Value Units Date/Time    Troponin [[id number]] Collected: 02/19/21 1200    Specimen: Blood Updated: 02/19/21 1204    Troponin [[id number]]  (Normal) Collected: 02/19/21 0929    Specimen: Blood Updated: 02/19/21 1009     Troponin T 0.012 ng/mL     Narrative:      Troponin T Reference Range:  <= 0.03 ng/mL-   Negative for AMI  >0.03 ng/mL-     Abnormal for myocardial necrosis.  Clinicians would have to utilize clinical acumen, EKG, Troponin and serial changes to determine if it is an Acute Myocardial Infarction or myocardial injury due to an underlying chronic condition.        Results may be falsely decreased if patient taking Biotin.      Extra Tubes [[id number]] Collected: 02/19/21 0732    Specimen: Blood, Venous Line Updated: 02/19/21 0845    Narrative:      The following orders were created for panel order Extra Tubes.  Procedure                               Abnormality         Status                     ---------                               -----------         ------                     Light Blue Top[[id number]]                                   Final result               Gold Top - SST[[id number]]                                   Final result                 Please view results for these tests on the individual orders.    Light Blue Top [[id number]] Collected: 02/19/21 0732    Specimen: Blood Updated: 02/19/21 0845     Extra Tube hold for add-on     Comment: Auto resulted       Gold Top - SST [[id number]] Collected: 02/19/21 0732    Specimen: Blood Updated: 02/19/21 0845     Extra Tube Hold for add-ons.     Comment: Auto resulted.       COVID-19 and FLU A/B PCR - Swab, Nasopharynx [[id number]]  (Normal) Collected: 02/19/21 0746    Specimen: Swab from Nasopharynx Updated: 02/19/21 0812     COVID19 Not Detected     Influenza A PCR Not Detected     Influenza B PCR Not Detected    Narrative:      Fact sheet for providers: https://www.fda.gov/media/256561/download    Fact sheet for patients: https://www.fda.gov/media/198556/download    Test performed by PCR.    Comprehensive Metabolic Panel [[id number]]  (Abnormal) Collected: 02/19/21 0727    Specimen: Blood from Arm, Right Updated: 02/19/21 0800     Glucose 203 mg/dL      BUN 52 mg/dL      Creatinine 2.87 mg/dL      Sodium 140 mmol/L      Potassium 3.7 mmol/L      Chloride 100 mmol/L      CO2 29.0 mmol/L      Calcium 10.5 mg/dL      Total Protein 7.5 g/dL      Albumin 4.40 g/dL      ALT (SGPT) 13 U/L      AST (SGOT) 18 U/L      Alkaline Phosphatase 47 U/L      Total Bilirubin 0.3 mg/dL      eGFR Non African Amer 22 mL/min/1.73       Globulin 3.1 gm/dL      A/G Ratio 1.4 g/dL      BUN/Creatinine Ratio 18.1     Anion Gap 11.0 mmol/L     Narrative:      GFR Normal >60  Chronic Kidney Disease <60  Kidney Failure <15      Troponin [[id number]]  (Normal) Collected: 02/19/21 0727    Specimen: Blood from Arm, Right Updated: 02/19/21 0800     Troponin T 0.015 ng/mL     Narrative:      Troponin T Reference Range:  <= 0.03 ng/mL-   Negative for AMI  >0.03 ng/mL-     Abnormal for myocardial necrosis.  Clinicians would have to utilize clinical acumen, EKG, Troponin and serial changes to determine if it is an Acute Myocardial Infarction or myocardial injury due to an underlying chronic condition.       Results may be falsely decreased if patient taking Biotin.      Magnesium [[id number]]  (Normal) Collected: 02/19/21 0727    Specimen: Blood from Arm, Right Updated: 02/19/21 0800     Magnesium 2.2 mg/dL     BNP [[id number]]  (Abnormal) Collected: 02/19/21 0727    Specimen: Blood from Arm, Right Updated: 02/19/21 0758     proBNP 1,193.0 pg/mL     Narrative:      Among patients with dyspnea, NT-proBNP is highly sensitive for the detection of acute congestive heart failure. In addition NT-proBNP of <300 pg/ml effectively rules out acute congestive heart failure with 99% negative predictive value.    Results may be falsely decreased if patient taking Biotin.      Urinalysis With Microscopic If Indicated (No Culture) - Urine, Clean Catch [[id number]]  (Normal) Collected: 02/19/21 0746    Specimen: Urine, Clean Catch Updated: 02/19/21 0754     Color, UA Yellow     Appearance, UA Clear     pH, UA 6.0     Specific Gravity, UA 1.008     Glucose, UA Negative     Ketones, UA Negative     Bilirubin, UA Negative     Blood, UA Negative     Protein, UA Negative     Leuk Esterase, UA Negative     Nitrite, UA Negative     Urobilinogen, UA 0.2 E.U./dL    Narrative:      Urine microscopic not indicated.    CBC & Differential [[id number]]  (Abnormal) Collected: 02/19/21 0727     Specimen: Blood Updated: 02/19/21 0740    Narrative:      The following orders were created for panel order CBC & Differential.  Procedure                               Abnormality         Status                     ---------                               -----------         ------                     CBC Auto Differential[[id number]]        Abnormal            Final result                 Please view results for these tests on the individual orders.    CBC Auto Differential [[id number]]  (Abnormal) Collected: 02/19/21 0727    Specimen: Blood Updated: 02/19/21 0740     WBC 8.03 10*3/mm3      RBC 3.25 10*6/mm3      Hemoglobin 8.2 g/dL      Hematocrit 26.8 %      MCV 82.5 fL      MCH 25.2 pg      MCHC 30.6 g/dL      RDW 15.1 %      RDW-SD 45.3 fl      MPV 10.0 fL      Platelets 332 10*3/mm3      Neutrophil % 59.2 %      Lymphocyte % 20.7 %      Monocyte % 13.1 %      Eosinophil % 6.0 %      Basophil % 0.6 %      Immature Grans % 0.4 %      Neutrophils, Absolute 4.76 10*3/mm3      Lymphocytes, Absolute 1.66 10*3/mm3      Monocytes, Absolute 1.05 10*3/mm3      Eosinophils, Absolute 0.48 10*3/mm3      Basophils, Absolute 0.05 10*3/mm3      Immature Grans, Absolute 0.03 10*3/mm3      nRBC 0.0 /100 WBC           I personally viewed and interpreted the patient's EKG/Telemetry data       Assessment/Plan:       #1 chest pain with pressure to sharp in quality    70 years old patient with a background history of CAD s/p PCI to LAD left circumflex system who presented evaluation chest pain described as pressure to sharp in quality with radiation to left upper extremity.  EKG paced rhythm no acute ST-T wave changes cardiac enzymes are negative.  Patient has associated shortness of breath and given bronchodilator and with significant provement in breathing and chest discomfort.  Patient have previous history of smoking but fortunately discontinued.  He has stage III chronic kidney disease and and blood counts bit on the lower side.   Patient known to Dr. Squires and if any work-up needed  prefer to get at [place] I have briefly discussed with Dr. Squires over the phone.  Keep him tonight and will add Imdur.  We will arrange an echocardiogram to reassess the biventricular functions.    #2 CAD s/p PCI  We will continue antiplatelet agent with the Brilinta    #3 hypertension with hypertensive heart disease    Patient was being managed as an outpatient with metoprolol, losartan.    The patient was educated again regarding risk factor lifestyle modification.  Patient was educated regarding the food containing high sodium and salt content.    #4 bioprosthetic aortic valve  Arrange an echocardiogram to reassess the valvular status    Dyspnea    Clinically patient is not in congestive heart failure and will continue diuretics as before      Thank you for allowing me to participate in the care of Kiran Moss. Feel free to contact me directly with any further questions or concerns.    Lino Ibarra MD  21  12:19 CST.      Part of this note may be an electronic transcription/translation of spoken language to printed text using the Dragon Dictation system.        Electronically signed by Lino Ibarra MD at 21 1244          Discharge Summary      Gavin Moreau MD at 21 1029          DISCHARGE SUMMARY    NAME: Kiran Moss   PHYSICIAN: Gavin Moreau MD  : 1950  MRN: [id number]    ADMITTED: 2021   DISCHARGED:  21    ADMISSION DIAGNOSES:   Present on Admission:  • Chest pain, rule out acute myocardial infarction  • Benign non-nodular prostatic hyperplasia with lower urinary tract symptoms  • Type 2 diabetes mellitus with microalbuminuria (CMS/HCC)  • Hyperlipidemia  • Atrial fibrillation (CMS/HCC)  • Stage 3 chronic kidney disease (CMS/HCC)  • Coronary atherosclerosis of native coronary artery    DISCHARGE DIAGNOSES:     Hyperlipidemia    Benign non-nodular prostatic hyperplasia with lower urinary tract  symptoms    Type 2 diabetes mellitus with microalbuminuria (CMS/HCC)    Coronary atherosclerosis of native coronary artery    Bicuspid aortic valve    Atrial fibrillation (CMS/HCC)    Stage 3 chronic kidney disease (CMS/HCC)    Chest pain, rule out acute myocardial infarction      SERVICE: Medicine. Attending  Gavin Moreau MD    CONSULTS:   Consult Orders (all) (From admission, onward)     Start     Ordered    02/19/21 0909  Inpatient Cardiology Consult  Once     Specialty:  Cardiology  Provider:  Lino Ibarra MD    02/19/21 0909                PROCEDURES:   Imaging Results (Last 7 Days)     Procedure Component Value Units Date/Time    XR Chest 1 View [[id number]] Collected: 02/19/21 0727     Updated: 02/19/21 0747    Narrative:      Chest x-ray single view.           CLINICAL INDICATION: Chest pain.        COMPARISON: Chest September 10, 2020     FINDINGS: Cardiac silhouette is normal in size. Pulmonary  vascularity is unremarkable.   Midline sternal sutures from prior cardiac surgery. Pacemaker  leads right atrium and right ventricle.    Lung fields are clear.  No focal infiltrate or consolidation.  No pleural effusion.  No  pneumothorax.      Impression:      No evidence of active disease.    Electronically signed by:  Giacomo Tarango MD  2/19/2021 7:45 AM CST  Workstation: [id number]          HISTORY OF PRESENT ILLNESS: *Copied from Jacquelyn Garcia MD's H&P*    Mr. Moss is a 70 year old man who presented with chest pain that started this morning. He has a history of CAD s/p stents, a fib on anticoagulation, s/p aortic valve replacement, CHF, HLD, HTN, DM with recently admission to the hospital for acute heart failure. He had chest pain with ambulation this morning when he was going to work. He had some associated shortness of breath. The pain radiated to his neck. He denies any fevers or chills.      In the ED, trop was negative x2. BNP 1193. Creatinine was noted to be 2.87 which is higher than his  baseline. He was recently started on metolazone after his recent heart failure exacerbation.     DIAGNOSTIC DATA:   Lab Results (last 7 days)     Procedure Component Value Units Date/Time    Basic Metabolic Panel [[id number]]  (Abnormal) Collected: 02/20/21 0607    Specimen: Blood Updated: 02/20/21 0710     Glucose 170 mg/dL      BUN 54 mg/dL      Creatinine 2.84 mg/dL      Sodium 140 mmol/L      Potassium 4.0 mmol/L      Chloride 100 mmol/L      CO2 30.0 mmol/L      Calcium 10.4 mg/dL      eGFR Non African Amer 22 mL/min/1.73      BUN/Creatinine Ratio 19.0     Anion Gap 10.0 mmol/L     Narrative:      GFR Normal >60  Chronic Kidney Disease <60  Kidney Failure <15      POC Glucose Once [[id number]]  (Abnormal) Collected: 02/20/21 0640    Specimen: Blood Updated: 02/20/21 0700     Glucose 190 mg/dL      Comment: RN NotifiedOperator: [id number] JULIA ELRITAMeter ID: [id number]       CBC & Differential [[id number]]  (Abnormal) Collected: 02/20/21 0607    Specimen: Blood Updated: 02/20/21 0651    Narrative:      The following orders were created for panel order CBC & Differential.  Procedure                               Abnormality         Status                     ---------                               -----------         ------                     CBC Auto Differential[[id number]]        Abnormal            Final result                 Please view results for these tests on the individual orders.    CBC Auto Differential [[id number]]  (Abnormal) Collected: 02/20/21 0607    Specimen: Blood Updated: 02/20/21 0651     WBC 8.27 10*3/mm3      RBC 3.20 10*6/mm3      Hemoglobin 8.0 g/dL      Hematocrit 25.9 %      MCV 80.9 fL      MCH 25.0 pg      MCHC 30.9 g/dL      RDW 15.4 %      RDW-SD 45.4 fl      MPV 10.1 fL      Platelets 376 10*3/mm3      Neutrophil % 57.4 %      Lymphocyte % 21.4 %      Monocyte % 11.9 %      Eosinophil % 7.7 %      Basophil % 1.2 %      Immature Grans % 0.4 %      Neutrophils, Absolute 4.75  10*3/mm3      Lymphocytes, Absolute 1.77 10*3/mm3      Monocytes, Absolute 0.98 10*3/mm3      Eosinophils, Absolute 0.64 10*3/mm3      Basophils, Absolute 0.10 10*3/mm3      Immature Grans, Absolute 0.03 10*3/mm3      nRBC 0.0 /100 WBC     POC Glucose Once [[id number]]  (Abnormal) Collected: 02/19/21 1937    Specimen: Blood Updated: 02/19/21 2120     Glucose 420 mg/dL      Comment: KRYS Acosta RNOperator: [id number] NOEL JEROMEECCAMeter ID: [id number]       POC Glucose Once [[id number]]  (Abnormal) Collected: 02/19/21 1615    Specimen: Blood Updated: 02/19/21 1630     Glucose 326 mg/dL      Comment: RN NotifiedOperator: [id number] XIMENA GOODYMeter ID: [id number]       Troponin [[id number]]  (Normal) Collected: 02/19/21 1200    Specimen: Blood Updated: 02/19/21 1232     Troponin T 0.013 ng/mL     Narrative:      Troponin T Reference Range:  <= 0.03 ng/mL-   Negative for AMI  >0.03 ng/mL-     Abnormal for myocardial necrosis.  Clinicians would have to utilize clinical acumen, EKG, Troponin and serial changes to determine if it is an Acute Myocardial Infarction or myocardial injury due to an underlying chronic condition.       Results may be falsely decreased if patient taking Biotin.      POC Glucose Once [[id number]]  (Abnormal) Collected: 02/19/21 1026    Specimen: Blood Updated: 02/19/21 1221     Glucose 203 mg/dL      Comment: RN NotifiedOperator: [id number] XIMENA SERRANOFANYMeter ID: [id number]       Troponin [[id number]]  (Normal) Collected: 02/19/21 0929    Specimen: Blood Updated: 02/19/21 1009     Troponin T 0.012 ng/mL     Narrative:      Troponin T Reference Range:  <= 0.03 ng/mL-   Negative for AMI  >0.03 ng/mL-     Abnormal for myocardial necrosis.  Clinicians would have to utilize clinical acumen, EKG, Troponin and serial changes to determine if it is an Acute Myocardial Infarction or myocardial injury due to an underlying chronic condition.       Results may be falsely decreased if patient taking  Biotin.      Extra Tubes [[id number]] Collected: 02/19/21 0732    Specimen: Blood, Venous Line Updated: 02/19/21 0845    Narrative:      The following orders were created for panel order Extra Tubes.  Procedure                               Abnormality         Status                     ---------                               -----------         ------                     Light Blue Top[[id number]]                                   Final result               Gold Top - SST[[id number]]                                   Final result                 Please view results for these tests on the individual orders.    Light Blue Top [[id number]] Collected: 02/19/21 0732    Specimen: Blood Updated: 02/19/21 0845     Extra Tube hold for add-on     Comment: Auto resulted       Gold Top - SST [[id number]] Collected: 02/19/21 0732    Specimen: Blood Updated: 02/19/21 0845     Extra Tube Hold for add-ons.     Comment: Auto resulted.       COVID-19 and FLU A/B PCR - Swab, Nasopharynx [[id number]]  (Normal) Collected: 02/19/21 0746    Specimen: Swab from Nasopharynx Updated: 02/19/21 0812     COVID19 Not Detected     Influenza A PCR Not Detected     Influenza B PCR Not Detected    Narrative:      Fact sheet for providers: https://www.fda.gov/media/440354/download    Fact sheet for patients: https://www.fda.gov/media/111085/download    Test performed by PCR.    Comprehensive Metabolic Panel [[id number]]  (Abnormal) Collected: 02/19/21 0727    Specimen: Blood from Arm, Right Updated: 02/19/21 0800     Glucose 203 mg/dL      BUN 52 mg/dL      Creatinine 2.87 mg/dL      Sodium 140 mmol/L      Potassium 3.7 mmol/L      Chloride 100 mmol/L      CO2 29.0 mmol/L      Calcium 10.5 mg/dL      Total Protein 7.5 g/dL      Albumin 4.40 g/dL      ALT (SGPT) 13 U/L      AST (SGOT) 18 U/L      Alkaline Phosphatase 47 U/L      Total Bilirubin 0.3 mg/dL      eGFR Non African Amer 22 mL/min/1.73      Globulin 3.1 gm/dL      A/G Ratio 1.4 g/dL       BUN/Creatinine Ratio 18.1     Anion Gap 11.0 mmol/L     Narrative:      GFR Normal >60  Chronic Kidney Disease <60  Kidney Failure <15      Troponin [[id number]]  (Normal) Collected: 02/19/21 0727    Specimen: Blood from Arm, Right Updated: 02/19/21 0800     Troponin T 0.015 ng/mL     Narrative:      Troponin T Reference Range:  <= 0.03 ng/mL-   Negative for AMI  >0.03 ng/mL-     Abnormal for myocardial necrosis.  Clinicians would have to utilize clinical acumen, EKG, Troponin and serial changes to determine if it is an Acute Myocardial Infarction or myocardial injury due to an underlying chronic condition.       Results may be falsely decreased if patient taking Biotin.      Magnesium [[id number]]  (Normal) Collected: 02/19/21 0727    Specimen: Blood from Arm, Right Updated: 02/19/21 0800     Magnesium 2.2 mg/dL     BNP [[id number]]  (Abnormal) Collected: 02/19/21 0727    Specimen: Blood from Arm, Right Updated: 02/19/21 0758     proBNP 1,193.0 pg/mL     Narrative:      Among patients with dyspnea, NT-proBNP is highly sensitive for the detection of acute congestive heart failure. In addition NT-proBNP of <300 pg/ml effectively rules out acute congestive heart failure with 99% negative predictive value.    Results may be falsely decreased if patient taking Biotin.      Urinalysis With Microscopic If Indicated (No Culture) - Urine, Clean Catch [[id number]]  (Normal) Collected: 02/19/21 0746    Specimen: Urine, Clean Catch Updated: 02/19/21 0754     Color, UA Yellow     Appearance, UA Clear     pH, UA 6.0     Specific Gravity, UA 1.008     Glucose, UA Negative     Ketones, UA Negative     Bilirubin, UA Negative     Blood, UA Negative     Protein, UA Negative     Leuk Esterase, UA Negative     Nitrite, UA Negative     Urobilinogen, UA 0.2 E.U./dL    Narrative:      Urine microscopic not indicated.    CBC & Differential [[id number]]  (Abnormal) Collected: 02/19/21 0727    Specimen: Blood Updated: 02/19/21 0740    Narrative:       The following orders were created for panel order CBC & Differential.  Procedure                               Abnormality         Status                     ---------                               -----------         ------                     CBC Auto Differential[[id number]]        Abnormal            Final result                 Please view results for these tests on the individual orders.    CBC Auto Differential [[id number]]  (Abnormal) Collected: 02/19/21 0727    Specimen: Blood Updated: 02/19/21 0740     WBC 8.03 10*3/mm3      RBC 3.25 10*6/mm3      Hemoglobin 8.2 g/dL      Hematocrit 26.8 %      MCV 82.5 fL      MCH 25.2 pg      MCHC 30.6 g/dL      RDW 15.1 %      RDW-SD 45.3 fl      MPV 10.0 fL      Platelets 332 10*3/mm3      Neutrophil % 59.2 %      Lymphocyte % 20.7 %      Monocyte % 13.1 %      Eosinophil % 6.0 %      Basophil % 0.6 %      Immature Grans % 0.4 %      Neutrophils, Absolute 4.76 10*3/mm3      Lymphocytes, Absolute 1.66 10*3/mm3      Monocytes, Absolute 1.05 10*3/mm3      Eosinophils, Absolute 0.48 10*3/mm3      Basophils, Absolute 0.05 10*3/mm3      Immature Grans, Absolute 0.03 10*3/mm3      nRBC 0.0 /100 WBC           HOSPITAL COURSE:  Active Hospital Problems    Diagnosis POA   • Chest pain, rule out acute myocardial infarction [R07.9] Yes   • Stage 3 chronic kidney disease (CMS/HCC) [N18.30] Yes   • Atrial fibrillation (CMS/[facility]) [I48.91] Yes     Atrial fibrillation - RSR post MAZE     • Type 2 diabetes mellitus with microalbuminuria (CMS/[facility]) [E11.29, R80.9] Yes   • Bicuspid aortic valve [Q23.1] Not Applicable     Bicuspid aortic valve - AVR replacement 12/16/14     • Coronary atherosclerosis of native coronary artery [I25.10] Yes     Coronary atherosclerosis of native coronary artery - S/P 3.0 x 23mm Xience V stent to mid RCA 12/11 (NSTEMI     • Benign non-nodular prostatic hyperplasia with lower urinary tract symptoms [N40.1] Yes     Post TUR 09/09/2016     • Hyperlipidemia  [E78.5] Yes       1. Chest pain  Results for orders placed during the hospital encounter of 02/19/21   Adult Transthoracic Echo Complete w/ Color, Spectral and Contrast if Necessary Per Protocol    Narrative · Estimated left ventricular EF = 57% Left ventricular ejection fraction   appears to be 56 - 60%. Left ventricular systolic function is normal.  · Left ventricular diastolic function is consistent with (grade Ia w/high   LAP) impaired relaxation.  · Left atrial volume is moderately increased.  · There is a bioprosthetic aortic valve present.  · Ao max PG 41.7 mmHg Ao mean PG 22 mmHg Ao V2 VTI 80.3 cm MARTY(I,D) 1.3   cm^      2/20. Patient is pain free. He has been ambulating without pain. Per cards patient had ECHO and Imdur was added. Patient is to have close follow up with his primary cardiologist on discharge.    2/19.  -given his history, cardiology was consulted. Dr. Ibarra saw the patient. He recommended overnight observation, starting IMDUR and follow up with his cardiologist in [place].  -trop trended and negative  -MICHELLE  -continue Brilinta   -continue Toprol, Losartan  -obtain echo     2. A Fib  2/20. Patient to continue with cardiology on outpatient. Continue BB, CCB, and AC on discharge    2/19.   -cardiac monitoring  -continue BB, CCB  -Xarelto     3. DM  2/20. Levemir on discharge. Recommended to hold glipizide on discharge. Follow up closely with PCP on discharge.    2/19.   -levemir 20mg daily  -SSI      4. CARO on CKD  Results from last 7 days   Lab Units 02/20/21  0607 02/19/21  0727   CREATININE mg/dL 2.84* 2.87*   2/20. Cr stable. Patient has established with nephrology for CKD. Metolazone on hold on discharge. Patient to have close follow up with nephrology on discharge. Patient will need a BMP on discharge. He is to call his nephrologist on Monday to discuss when they want this to happen.    2/19.   -will stop metolazone and monitor renal function. Could be secondary to the addition of new  "diuretic.     PHYSICAL EXAM ON DISCHARGE:  /59 (BP Location: Left arm, Patient Position: Sitting)   Pulse 60   Temp 97 °F (36.1 °C) (Axillary)   Resp 17   Ht 172.7 cm (68\")   Wt 89.3 kg (196 lb 12.8 oz)   SpO2 99%   BMI 29.92 kg/m²      Physical Exam  Vitals signs and nursing note reviewed.   Constitutional:       General: He is not in acute distress.     Appearance: Normal appearance. He is not ill-appearing.   HENT:      Head: Normocephalic and atraumatic.      Right Ear: External ear normal.      Left Ear: External ear normal.      Nose: Nose normal.   Eyes:      Extraocular Movements: Extraocular movements intact.      Conjunctiva/sclera: Conjunctivae normal.      Pupils: Pupils are equal, round, and reactive to light.   Neck:      Musculoskeletal: Normal range of motion.   Cardiovascular:      Rate and Rhythm: Normal rate.   Pulmonary:      Effort: Pulmonary effort is normal.      Breath sounds: Normal breath sounds.   Abdominal:      General: Abdomen is flat. Bowel sounds are normal.      Palpations: Abdomen is soft.   Musculoskeletal: Normal range of motion.         General: No swelling.   Skin:     General: Skin is warm and dry.   Neurological:      General: No focal deficit present.      Mental Status: He is alert and oriented to person, place, and time.      Gait: Gait normal.   Psychiatric:         Mood and Affect: Mood normal.         Behavior: Behavior normal.         CONDITION ON DISCHARGE:   Stable    Review of Systems   Constitutional: Negative for activity change, appetite change, fatigue and fever.   Respiratory: Negative for cough and shortness of breath.    Cardiovascular: Negative for chest pain (Patient has ambulated multiple times without pain).   Gastrointestinal: Negative for abdominal pain and nausea.   Genitourinary: Negative for difficulty urinating and dysuria.   Musculoskeletal: Positive for arthralgias. Negative for gait problem.   Skin: Negative for color change and rash. "   Neurological: Negative for dizziness, weakness and headaches.   Psychiatric/Behavioral: Negative for agitation, confusion and sleep disturbance.       DISPOSITION:  Home or Self Care    DISCHARGE MEDICATIONS     Discharge Medications      New Medications      Instructions Start Date   isosorbide mononitrate 30 MG 24 hr tablet  Commonly known as: IMDUR   30 mg, Oral, Every 24 Hours Scheduled   Start Date: February 21, 2021        Changes to Medications      Instructions Start Date   insulin glargine 100 UNIT/ML injection  Commonly known as: Lantus  What changed:   · how much to take  · when to take this   38 Units, Subcutaneous, Daily         Continue These Medications      Instructions Start Date   dilTIAZem  MG 24 hr capsule  Commonly known as: CARDIZEM CD   120 mg, Oral, Daily      fenofibrate 160 MG tablet   160 mg, Oral, Daily      furosemide 40 MG tablet  Commonly known as: LASIX   40 mg, Oral, Daily      INSULIN LISPRO SC   20 Units, Subcutaneous, 3 Times Daily      losartan 50 MG tablet  Commonly known as: COZAAR   50 mg, Oral, Daily      metoprolol succinate  MG 24 hr tablet  Commonly known as: TOPROL-XL   100 mg, Oral, Daily      multivitamin with minerals tablet tablet   1 tablet, Oral, Daily      ONE TOUCH ULTRA 2 w/Device kit   USE TO TEST BLOOD SUGAR TWICE DAILY      ONE TOUCH ULTRA TEST test strip  Generic drug: glucose blood   USE TO TEST BLOOD SUGAR TWICE DAILY      ticagrelor 90 MG tablet tablet  Commonly known as: BRILINTA   90 mg, Oral, 2 Times Daily      VASCEPA PO   1 g, Oral, 2 times daily, 2 capsules bid       VITAMIN D3 PO   4,000 Units, Oral, Daily      Xarelto 20 MG tablet  Generic drug: rivaroxaban   20 mg, Oral, Daily      ZINC PO   Oral, Daily, Unknown dosage          Stop These Medications    glipizide 10 MG tablet  Commonly known as: GLUCOTROL     metOLazone 5 MG tablet  Commonly known as: ZAROXOLYN            INSTRUCTIONS:  Activity:   Activity Instructions     Activity  as Tolerated          Diet:   Diet Instructions     Advance Diet As Tolerated            Special instructions: Patient instructed to call MD or return to ED with worsening shortness of breath, chest pain, fever greater than 100.4 degrees F or any other medical concerns..    FOLLOW UP:   Follow-up Information     Dana Squires MD. Schedule an appointment as soon as possible for a visit in 1 week(s).    Specialties: Cardiology, Interventional Cardiology  Why: Hospital follow up.  Contact information:  [address]  [address]  [facility] [address] [phone]             Augustin Fletcher MD. Schedule an appointment as soon as possible for a visit in 1 week(s).    Specialty: Nephrology  Why: Hospital follow up. Needs BMP. Metolazone on hold on discharge.  Contact information:  [address] [address] [facility] [address]  [phone]             Elvis Jones MD. Schedule an appointment as soon as possible for a visit in 1 week(s).    Specialty: Family Medicine  Contact information:  [address] [address] [phone]                   PENDING TEST RESULTS AT DISCHARGE      Time: Discharge 30 min          This document has been electronically signed by Gavin Moreau MD on February 20, 2021 10:29 CST        Electronically signed by Gavin Moreau MD at 02/20/21 9678

## 2021-03-21 LAB
QT INTERVAL: 498 MS
QTC INTERVAL: 529 MS

## 2024-02-02 ENCOUNTER — READMISSION MANAGEMENT (OUTPATIENT)
Dept: CALL CENTER | Facility: HOSPITAL | Age: 74
End: 2024-02-02
Payer: COMMERCIAL

## 2024-02-02 NOTE — OUTREACH NOTE
Prep Survey      Flowsheet Row Responses   [facility] facility patient discharged from? Non-[facility]   Is LACE score < 7 ? Non-BH Discharge   Eligibility [facility]   Date of Admission 01/30/24   Date of Discharge 02/01/24   Discharge Disposition Home or Self Care   Discharge diagnosis PACEMAKER LEAD REVISION   Does the patient have one of the following disease processes/diagnoses(primary or secondary)? General Surgery   Prep survey completed? Yes            Lynne Mckeon Registered Nurse

## 2024-02-05 ENCOUNTER — TRANSITIONAL CARE MANAGEMENT TELEPHONE ENCOUNTER (OUTPATIENT)
Dept: CALL CENTER | Facility: HOSPITAL | Age: 74
End: 2024-02-05
Payer: COMMERCIAL

## 2024-02-05 NOTE — PROGRESS NOTES
Can you see if this patient is going to see Dr. Jones as PCP please. It does not look like he has ever seen him in our office.

## 2024-02-05 NOTE — OUTREACH NOTE
Call Center TCM Note      Flowsheet Row Responses   [facility] patient discharged from? Non-   Does the patient have one of the following disease processes/diagnoses(primary or secondary)? Other   TCM attempt successful? Yes   Call start time 1439   Call end time 1440   Discharge diagnosis PACEMAKER LEAD REVISION   Meds reviewed with patient/caregiver? Yes   Is the patient taking all medications as directed (includes completed medication regime)? Yes   Does the patient have an appointment with their PCP within 7-14 days of discharge? No   Nursing Interventions Patient declined scheduling/rescheduling appointment at this time   Has home health visited the patient within 72 hours of discharge? N/A   Psychosocial issues? No   What is the patient's perception of their health status since discharge? Improving   Is the patient/caregiver able to teach back signs and symptoms related to disease process for when to call PCP? Yes   Is the patient/caregiver able to teach back signs and symptoms related to disease process for when to call 911? Yes   Is the patient/caregiver able to teach back the hierarchy of who to call/visit for symptoms/problems? PCP, Specialist, Home health nurse, Urgent Care, ED, 911 Yes   If the patient is a current smoker, are they able to teach back resources for cessation? Not a smoker   TCM call completed? Yes   Wrap up additional comments Patient kept call brief. Stating he is doing fine.   Call end time 1440   Would this patient benefit from a Referral to Amb Social Work? No   Is the patient interested in additional calls from an ambulatory ? No            Promise Baird LPN    2/5/2024, 14:44 CST

## (undated) DEVICE — CANN SMPL SOFTECH BIFLO ETCO2 A/M 7FT